# Patient Record
Sex: FEMALE | Race: WHITE | NOT HISPANIC OR LATINO | Employment: FULL TIME | URBAN - METROPOLITAN AREA
[De-identification: names, ages, dates, MRNs, and addresses within clinical notes are randomized per-mention and may not be internally consistent; named-entity substitution may affect disease eponyms.]

---

## 2017-09-19 ENCOUNTER — HOSPITAL ENCOUNTER (EMERGENCY)
Facility: HOSPITAL | Age: 37
Discharge: HOME | End: 2017-09-19
Attending: EMERGENCY MEDICINE | Admitting: EMERGENCY MEDICINE

## 2017-09-19 ENCOUNTER — APPOINTMENT (OUTPATIENT)
Dept: ULTRASOUND IMAGING | Facility: HOSPITAL | Age: 37
End: 2017-09-19

## 2017-09-19 VITALS
BODY MASS INDEX: 33.74 KG/M2 | TEMPERATURE: 98.2 F | RESPIRATION RATE: 18 BRPM | WEIGHT: 215 LBS | DIASTOLIC BLOOD PRESSURE: 69 MMHG | SYSTOLIC BLOOD PRESSURE: 113 MMHG | OXYGEN SATURATION: 94 % | HEIGHT: 67 IN | HEART RATE: 75 BPM

## 2017-09-19 DIAGNOSIS — O03.9 MISCARRIAGE: Primary | ICD-10-CM

## 2017-09-19 DIAGNOSIS — N93.9 VAGINAL BLEEDING: ICD-10-CM

## 2017-09-19 LAB
ABO GROUP BLD: NORMAL
ALBUMIN SERPL-MCNC: 4.4 G/DL (ref 3.2–4.8)
ALBUMIN/GLOB SERPL: 1.6 G/DL (ref 1.5–2.5)
ALP SERPL-CCNC: 53 U/L (ref 25–100)
ALT SERPL W P-5'-P-CCNC: 20 U/L (ref 7–40)
ANION GAP SERPL CALCULATED.3IONS-SCNC: 7 MMOL/L (ref 3–11)
AST SERPL-CCNC: 16 U/L (ref 0–33)
BASOPHILS # BLD AUTO: 0.05 10*3/MM3 (ref 0–0.2)
BASOPHILS NFR BLD AUTO: 0.7 % (ref 0–1)
BILIRUB SERPL-MCNC: 0.3 MG/DL (ref 0.3–1.2)
BUN BLD-MCNC: 6 MG/DL (ref 9–23)
BUN/CREAT SERPL: 10 (ref 7–25)
CALCIUM SPEC-SCNC: 9.5 MG/DL (ref 8.7–10.4)
CHLORIDE SERPL-SCNC: 108 MMOL/L (ref 99–109)
CO2 SERPL-SCNC: 22 MMOL/L (ref 20–31)
CREAT BLD-MCNC: 0.6 MG/DL (ref 0.6–1.3)
DEPRECATED RDW RBC AUTO: 43.6 FL (ref 37–54)
EOSINOPHIL # BLD AUTO: 0.04 10*3/MM3 (ref 0–0.3)
EOSINOPHIL NFR BLD AUTO: 0.5 % (ref 0–3)
ERYTHROCYTE [DISTWIDTH] IN BLOOD BY AUTOMATED COUNT: 12.4 % (ref 11.3–14.5)
GFR SERPL CREATININE-BSD FRML MDRD: 112 ML/MIN/1.73
GLOBULIN UR ELPH-MCNC: 2.8 GM/DL
GLUCOSE BLD-MCNC: 90 MG/DL (ref 70–100)
HCG INTACT+B SERPL-ACNC: 5313 MIU/ML
HCT VFR BLD AUTO: 37.4 % (ref 34.5–44)
HGB BLD-MCNC: 12.7 G/DL (ref 11.5–15.5)
HOLD SPECIMEN: NORMAL
HOLD SPECIMEN: NORMAL
IMM GRANULOCYTES # BLD: 0.02 10*3/MM3 (ref 0–0.03)
IMM GRANULOCYTES NFR BLD: 0.3 % (ref 0–0.6)
LYMPHOCYTES # BLD AUTO: 2.48 10*3/MM3 (ref 0.6–4.8)
LYMPHOCYTES NFR BLD AUTO: 32.5 % (ref 24–44)
MCH RBC QN AUTO: 32.7 PG (ref 27–31)
MCHC RBC AUTO-ENTMCNC: 34 G/DL (ref 32–36)
MCV RBC AUTO: 96.4 FL (ref 80–99)
MONOCYTES # BLD AUTO: 0.56 10*3/MM3 (ref 0–1)
MONOCYTES NFR BLD AUTO: 7.3 % (ref 0–12)
NEUTROPHILS # BLD AUTO: 4.49 10*3/MM3 (ref 1.5–8.3)
NEUTROPHILS NFR BLD AUTO: 58.7 % (ref 41–71)
NUMBER OF DOSES: NORMAL
PLATELET # BLD AUTO: 257 10*3/MM3 (ref 150–450)
PMV BLD AUTO: 11.8 FL (ref 6–12)
POTASSIUM BLD-SCNC: 3.4 MMOL/L (ref 3.5–5.5)
PROT SERPL-MCNC: 7.2 G/DL (ref 5.7–8.2)
RBC # BLD AUTO: 3.88 10*6/MM3 (ref 3.89–5.14)
RH BLD: POSITIVE
SODIUM BLD-SCNC: 137 MMOL/L (ref 132–146)
WBC NRBC COR # BLD: 7.64 10*3/MM3 (ref 3.5–10.8)
WHOLE BLOOD HOLD SPECIMEN: NORMAL
WHOLE BLOOD HOLD SPECIMEN: NORMAL

## 2017-09-19 PROCEDURE — 85025 COMPLETE CBC W/AUTO DIFF WBC: CPT | Performed by: EMERGENCY MEDICINE

## 2017-09-19 PROCEDURE — 76817 TRANSVAGINAL US OBSTETRIC: CPT

## 2017-09-19 PROCEDURE — 84702 CHORIONIC GONADOTROPIN TEST: CPT | Performed by: EMERGENCY MEDICINE

## 2017-09-19 PROCEDURE — 86900 BLOOD TYPING SEROLOGIC ABO: CPT | Performed by: EMERGENCY MEDICINE

## 2017-09-19 PROCEDURE — 99284 EMERGENCY DEPT VISIT MOD MDM: CPT

## 2017-09-19 PROCEDURE — 80053 COMPREHEN METABOLIC PANEL: CPT | Performed by: NURSE PRACTITIONER

## 2017-09-19 PROCEDURE — 86901 BLOOD TYPING SEROLOGIC RH(D): CPT | Performed by: EMERGENCY MEDICINE

## 2017-09-19 RX ORDER — IBUPROFEN 600 MG/1
600 TABLET ORAL EVERY 8 HOURS PRN
Qty: 15 TABLET | Refills: 0 | Status: SHIPPED | OUTPATIENT
Start: 2017-09-19 | End: 2021-11-15

## 2017-09-19 RX ORDER — SODIUM CHLORIDE 0.9 % (FLUSH) 0.9 %
10 SYRINGE (ML) INJECTION AS NEEDED
Status: DISCONTINUED | OUTPATIENT
Start: 2017-09-19 | End: 2017-09-19 | Stop reason: HOSPADM

## 2017-09-19 NOTE — ED PROVIDER NOTES
Subjective   Patient is a 37 y.o. female presenting with vaginal bleeding.   History provided by:  Patient  Vaginal Bleeding   Quality:  Bright red  Severity:  Moderate  Onset quality:  Sudden  Duration:  1 day  Timing:  Constant  Progression:  Waxing and waning  Chronicity:  New  Menstrual history:  Regular  Possible pregnancy: yes    Context comment:  Patient is a proximally 7 weeks pregnant.  Began bleeding this morning and has developed cramping.  Has history of miscarriage times one in the past  Relieved by:  Nothing  Worsened by:  Nothing  Ineffective treatments:  None tried  Associated symptoms: no dizziness, no dysuria, no fever and no nausea    Associated symptoms comment:  Pelvic cramping  Risk factors: prior miscarriage        Review of Systems   Constitutional: Negative for fever.   Gastrointestinal: Negative for diarrhea, nausea and vomiting.   Genitourinary: Positive for vaginal bleeding. Negative for dysuria, flank pain, frequency and hematuria.   Neurological: Negative for dizziness and light-headedness.   All other systems reviewed and are negative.      History reviewed. No pertinent past medical history.    No Known Allergies    Past Surgical History:   Procedure Laterality Date   • ANKLE SURGERY         History reviewed. No pertinent family history.    Social History     Social History   • Marital status: Single     Spouse name: N/A   • Number of children: N/A   • Years of education: N/A     Social History Main Topics   • Smoking status: Current Every Day Smoker     Packs/day: 1.00     Types: Cigarettes   • Smokeless tobacco: None   • Alcohol use No   • Drug use: No   • Sexual activity: Not Asked     Other Topics Concern   • None     Social History Narrative   • None           Objective   Physical Exam   Constitutional: She is oriented to person, place, and time. She appears well-developed and well-nourished.   HENT:   Right Ear: External ear normal.   Left Ear: External ear normal.    Mouth/Throat: Oropharynx is clear and moist.   Cardiovascular: Normal rate, regular rhythm, normal heart sounds and intact distal pulses.    Pulmonary/Chest: Effort normal and breath sounds normal.   Abdominal: Soft. Bowel sounds are normal. There is tenderness (Mild to moderate suprapubic tenderness). There is no rebound and no guarding.   Genitourinary: Uterus normal. Cervix exhibits no motion tenderness. Right adnexum displays no mass. Left adnexum displays no mass. There is bleeding in the vagina.   Neurological: She is alert and oriented to person, place, and time.   Skin: Skin is warm and dry.   Psychiatric: She has a normal mood and affect. Her behavior is normal.   Vitals reviewed.      Procedures         ED Course  ED Course      Recent Results (from the past 24 hour(s))   hCG, Quantitative, Pregnancy    Collection Time: 17  3:57 PM   Result Value Ref Range    HCG Quantitative 5313.00 mIU/mL    RhIg Evaluation    Collection Time: 17  3:57 PM   Result Value Ref Range    ABO Type A     RH type Positive    Doses of Rh Immune Globulin    Collection Time: 17  3:57 PM   Result Value Ref Range    Number of Doses       RhIg is not indicated due to the patient's Rh status   Light Blue Top    Collection Time: 17  3:57 PM   Result Value Ref Range    Extra Tube hold for add-on    Green Top (Gel)    Collection Time: 17  3:57 PM   Result Value Ref Range    Extra Tube Hold for add-ons.    Lavender Top    Collection Time: 17  3:57 PM   Result Value Ref Range    Extra Tube hold for add-on    Gold Top - SST    Collection Time: 17  3:57 PM   Result Value Ref Range    Extra Tube Hold for add-ons.    CBC Auto Differential    Collection Time: 17  3:57 PM   Result Value Ref Range    WBC 7.64 3.50 - 10.80 10*3/mm3    RBC 3.88 (L) 3.89 - 5.14 10*6/mm3    Hemoglobin 12.7 11.5 - 15.5 g/dL    Hematocrit 37.4 34.5 - 44.0 %    MCV 96.4 80.0 - 99.0 fL    MCH 32.7 (H) 27.0 - 31.0  pg    MCHC 34.0 32.0 - 36.0 g/dL    RDW 12.4 11.3 - 14.5 %    RDW-SD 43.6 37.0 - 54.0 fl    MPV 11.8 6.0 - 12.0 fL    Platelets 257 150 - 450 10*3/mm3    Neutrophil % 58.7 41.0 - 71.0 %    Lymphocyte % 32.5 24.0 - 44.0 %    Monocyte % 7.3 0.0 - 12.0 %    Eosinophil % 0.5 0.0 - 3.0 %    Basophil % 0.7 0.0 - 1.0 %    Immature Grans % 0.3 0.0 - 0.6 %    Neutrophils, Absolute 4.49 1.50 - 8.30 10*3/mm3    Lymphocytes, Absolute 2.48 0.60 - 4.80 10*3/mm3    Monocytes, Absolute 0.56 0.00 - 1.00 10*3/mm3    Eosinophils, Absolute 0.04 0.00 - 0.30 10*3/mm3    Basophils, Absolute 0.05 0.00 - 0.20 10*3/mm3    Immature Grans, Absolute 0.02 0.00 - 0.03 10*3/mm3   Comprehensive Metabolic Panel    Collection Time: 09/19/17  3:57 PM   Result Value Ref Range    Glucose 90 70 - 100 mg/dL    BUN 6 (L) 9 - 23 mg/dL    Creatinine 0.60 0.60 - 1.30 mg/dL    Sodium 137 132 - 146 mmol/L    Potassium 3.4 (L) 3.5 - 5.5 mmol/L    Chloride 108 99 - 109 mmol/L    CO2 22.0 20.0 - 31.0 mmol/L    Calcium 9.5 8.7 - 10.4 mg/dL    Total Protein 7.2 5.7 - 8.2 g/dL    Albumin 4.40 3.20 - 4.80 g/dL    ALT (SGPT) 20 7 - 40 U/L    AST (SGOT) 16 0 - 33 U/L    Alkaline Phosphatase 53 25 - 100 U/L    Total Bilirubin 0.3 0.3 - 1.2 mg/dL    eGFR Non African Amer 112 >60 mL/min/1.73    Globulin 2.8 gm/dL    A/G Ratio 1.6 1.5 - 2.5 g/dL    BUN/Creatinine Ratio 10.0 7.0 - 25.0    Anion Gap 7.0 3.0 - 11.0 mmol/L     Note: In addition to lab results from this visit, the labs listed above may include labs taken at another facility or during a different encounter within the last 24 hours. Please correlate lab times with ED admission and discharge times for further clarification of the services performed during this visit.    US Ob Transvaginal    (Results Pending)     Vitals:    09/19/17 1550 09/19/17 1715 09/19/17 1716 09/19/17 1717   BP: 138/78 128/76 126/76 113/69   BP Location: Left arm      Patient Position: Sitting Lying Sitting Standing   Pulse: 80 65 78 75  "  Resp: 18      Temp: 98.2 °F (36.8 °C)      TempSrc: Oral      SpO2: 94%      Weight: 215 lb (97.5 kg)      Height: 67\" (170.2 cm)        Medications   sodium chloride 0.9 % flush 10 mL (not administered)     ECG/EMG Results (last 24 hours)     ** No results found for the last 24 hours. **          US Transvaginal OB: No identifiable fetal pole or yolk sac.  No identifiable products of conception.  Per radiology voice dictation      Discussed ultrasound findings and blood work with patient.  Discussed the need for close follow-up with OB in the next day or 2.  Instructed to return to the ER with development of fever, uncontrolled pain or significant increase in bleeding.  The patient verbalized understanding        MDM    Final diagnoses:   Miscarriage   Vaginal bleeding            Ameena Mansfield, APRN  09/19/17 1812    "

## 2017-09-19 NOTE — DISCHARGE INSTRUCTIONS
No strenuous activity or heavy lifting for the next 3-4 days.  Follow-up with Dr. Colón with OB/GYN in the next 1-2 days.  Return to the ER with a significant increase in bleeding, development of dizziness, lightheadedness, increased in pain or new symptoms.  May take ibuprofen as instructed for cramping.

## 2017-09-21 ENCOUNTER — OFFICE VISIT (OUTPATIENT)
Dept: OBSTETRICS AND GYNECOLOGY | Facility: CLINIC | Age: 37
End: 2017-09-21

## 2017-09-21 VITALS
HEART RATE: 76 BPM | WEIGHT: 213 LBS | SYSTOLIC BLOOD PRESSURE: 120 MMHG | BODY MASS INDEX: 33.36 KG/M2 | DIASTOLIC BLOOD PRESSURE: 76 MMHG

## 2017-09-21 DIAGNOSIS — O02.1 MISSED ABORTION: Primary | ICD-10-CM

## 2017-09-21 PROCEDURE — 99203 OFFICE O/P NEW LOW 30 MIN: CPT | Performed by: OBSTETRICS & GYNECOLOGY

## 2017-09-21 RX ORDER — MISOPROSTOL 200 UG/1
TABLET ORAL
Qty: 8 TABLET | Refills: 0 | Status: SHIPPED | OUTPATIENT
Start: 2017-09-21 | End: 2019-05-06

## 2017-09-21 NOTE — PROGRESS NOTES
Subjective   Vania Garcia is a 37 y.o. female.     History of Present Illness  Patient has a anovulatory gestational sac on ultrasound done in the emergency room.  This is her second ultrasound showing the same problem.  The patient is now bleeding.  She does not want a D&C but would like to use Cytotec to produce the spontaneous .  Her blood type is A+  The following portions of the patient's history were reviewed and updated as appropriate: allergies, current medications, past family history, past medical history, past social history, past surgical history and problem list.    Review of Systems   Constitutional: Negative.    HENT: Negative.    Respiratory: Negative.    Cardiovascular: Negative.    Gastrointestinal: Negative.    Endocrine: Negative.    Genitourinary: Negative.    Musculoskeletal: Negative.    Skin: Negative.    Neurological: Negative.    Psychiatric/Behavioral: Negative.        Objective   Physical Exam   Constitutional: She is oriented to person, place, and time. She appears well-developed and well-nourished.   Neck: Normal range of motion. Neck supple.   Cardiovascular: Normal rate, regular rhythm, normal heart sounds and intact distal pulses.  Exam reveals no gallop and no friction rub.    No murmur heard.  Pulmonary/Chest: Effort normal and breath sounds normal. No respiratory distress. She has no wheezes. She has no rales. She exhibits no tenderness.   Abdominal: Soft. Bowel sounds are normal. She exhibits no distension and no mass. There is no tenderness. There is no rebound and no guarding. No hernia.   Genitourinary: Pelvic exam was performed with patient supine. No labial fusion. There is no rash, tenderness, lesion or injury on the right labia. There is no rash, tenderness, lesion or injury on the left labia. Uterus is enlarged. Uterus is not deviated, not fixed and not tender. Cervix exhibits discharge. Cervix exhibits no motion tenderness and no friability. Right adnexum  displays no mass, no tenderness and no fullness. Left adnexum displays no mass, no tenderness and no fullness. There is bleeding in the vagina. No erythema or tenderness in the vagina. No foreign body in the vagina. No signs of injury around the vagina. No vaginal discharge found.       Musculoskeletal: Normal range of motion. She exhibits no edema or deformity.   Neurological: She is alert and oriented to person, place, and time. She has normal reflexes.   Skin: Skin is warm and dry.   Psychiatric: She has a normal mood and affect. Her behavior is normal. Judgment and thought content normal.       Assessment/Plan   Vania was seen today for gynecologic exam.    Diagnoses and all orders for this visit:    Missed   -     misoprostol (CYTOTEC) 200 MCG tablet; Insert 4 tablets into the vagina and repeat in 12 hours         Return 1 week    Emil Jacques MD

## 2017-09-28 ENCOUNTER — OFFICE VISIT (OUTPATIENT)
Dept: OBSTETRICS AND GYNECOLOGY | Facility: CLINIC | Age: 37
End: 2017-09-28

## 2017-09-28 VITALS
WEIGHT: 215 LBS | SYSTOLIC BLOOD PRESSURE: 108 MMHG | DIASTOLIC BLOOD PRESSURE: 70 MMHG | BODY MASS INDEX: 33.67 KG/M2 | HEART RATE: 80 BPM

## 2017-09-28 DIAGNOSIS — Z33.2: Primary | ICD-10-CM

## 2017-09-28 DIAGNOSIS — Z30.42 ENCOUNTER FOR DEPO-PROVERA CONTRACEPTION: ICD-10-CM

## 2017-09-28 DIAGNOSIS — O02.1 MISSED ABORTION: ICD-10-CM

## 2017-09-28 PROCEDURE — 96372 THER/PROPH/DIAG INJ SC/IM: CPT | Performed by: OBSTETRICS & GYNECOLOGY

## 2017-09-28 PROCEDURE — 99212 OFFICE O/P EST SF 10 MIN: CPT | Performed by: OBSTETRICS & GYNECOLOGY

## 2017-09-28 RX ORDER — MEDROXYPROGESTERONE ACETATE 150 MG/ML
150 INJECTION, SUSPENSION INTRAMUSCULAR
Qty: 1 ML | Refills: 3 | Status: SHIPPED | OUTPATIENT
Start: 2017-09-28 | End: 2018-09-04

## 2017-09-28 RX ORDER — MEDROXYPROGESTERONE ACETATE 150 MG/ML
150 INJECTION, SUSPENSION INTRAMUSCULAR ONCE
Status: COMPLETED | OUTPATIENT
Start: 2017-09-28 | End: 2017-09-28

## 2017-09-28 RX ADMIN — MEDROXYPROGESTERONE ACETATE 150 MG: 150 INJECTION, SUSPENSION INTRAMUSCULAR at 15:46

## 2017-09-28 NOTE — PROGRESS NOTES
Subjective   Vania Garcia is a 37 y.o. female.     History of Present Illness  Patient was diagnosed with missed  by last ultrasound last Thursday.  She underwent Cytotec induction and proceeded to spontaneously abort on 2017.  The patient has minimal bleeding now.  She wants to use Depo-Provera for birth control.  A prescription was sent to her pharmacy and the patient will obtain the medication.  She will return to the office today for initiation of Depo-Provera 150 mg IM and repeat in 3 months.  The patient is A+.    The following portions of the patient's history were reviewed and updated as appropriate: allergies, current medications, past family history, past medical history, past social history, past surgical history and problem list.    Review of Systems   Constitutional: Negative.    Respiratory: Negative.    Cardiovascular: Negative.    Gastrointestinal: Negative.    Genitourinary:        Small amount of vaginal bleeding persist, no problems   Psychiatric/Behavioral: Negative.        Objective   Physical Exam   Constitutional: She appears well-developed and well-nourished.   Abdominal: Soft. Bowel sounds are normal.   Psychiatric: She has a normal mood and affect. Her behavior is normal. Judgment and thought content normal.   Nursing note and vitals reviewed.      Assessment/Plan   Vania was seen today for follow-up.    Diagnoses and all orders for this visit:    Legally induced     Missed     Encounter for Depo-Provera contraception  -     medroxyPROGESTERone (DEPO-PROVERA) 150 MG/ML injection; Inject 1 mL into the shoulder, thigh, or buttocks Every 3 (Three) Months.       Return 3 months    Emil Jacques MD

## 2017-12-20 ENCOUNTER — CLINICAL SUPPORT (OUTPATIENT)
Dept: OBSTETRICS AND GYNECOLOGY | Facility: CLINIC | Age: 37
End: 2017-12-20

## 2017-12-20 DIAGNOSIS — Z30.42 ENCOUNTER FOR SURVEILLANCE OF INJECTABLE CONTRACEPTIVE: Primary | ICD-10-CM

## 2017-12-20 RX ORDER — MEDROXYPROGESTERONE ACETATE 150 MG/ML
150 INJECTION, SUSPENSION INTRAMUSCULAR ONCE
Status: DISCONTINUED | OUTPATIENT
Start: 2017-12-20 | End: 2017-12-20

## 2017-12-20 RX ORDER — MEDROXYPROGESTERONE ACETATE 150 MG/ML
150 INJECTION, SUSPENSION INTRAMUSCULAR ONCE
Status: COMPLETED | OUTPATIENT
Start: 2017-12-20 | End: 2017-12-20

## 2017-12-20 RX ADMIN — MEDROXYPROGESTERONE ACETATE 150 MG: 150 INJECTION, SUSPENSION INTRAMUSCULAR at 16:24

## 2018-03-20 ENCOUNTER — CLINICAL SUPPORT (OUTPATIENT)
Dept: OBSTETRICS AND GYNECOLOGY | Facility: CLINIC | Age: 38
End: 2018-03-20

## 2018-03-20 VITALS — WEIGHT: 229 LBS | BODY MASS INDEX: 35.94 KG/M2 | HEIGHT: 67 IN

## 2018-03-20 DIAGNOSIS — Z30.42 ENCOUNTER FOR SURVEILLANCE OF INJECTABLE CONTRACEPTIVE: Primary | ICD-10-CM

## 2018-03-20 PROCEDURE — 96372 THER/PROPH/DIAG INJ SC/IM: CPT | Performed by: OBSTETRICS & GYNECOLOGY

## 2018-03-20 RX ORDER — MEDROXYPROGESTERONE ACETATE 150 MG/ML
150 INJECTION, SUSPENSION INTRAMUSCULAR ONCE
Status: COMPLETED | OUTPATIENT
Start: 2018-03-20 | End: 2018-03-20

## 2018-03-20 RX ADMIN — MEDROXYPROGESTERONE ACETATE 150 MG: 150 INJECTION, SUSPENSION INTRAMUSCULAR at 15:38

## 2018-06-12 ENCOUNTER — CLINICAL SUPPORT (OUTPATIENT)
Dept: OBSTETRICS AND GYNECOLOGY | Facility: CLINIC | Age: 38
End: 2018-06-12

## 2018-06-12 VITALS — WEIGHT: 227.4 LBS | BODY MASS INDEX: 35.69 KG/M2 | HEIGHT: 67 IN

## 2018-06-12 DIAGNOSIS — Z30.42 ENCOUNTER FOR SURVEILLANCE OF INJECTABLE CONTRACEPTIVE: Primary | ICD-10-CM

## 2018-06-12 PROCEDURE — 96372 THER/PROPH/DIAG INJ SC/IM: CPT | Performed by: OBSTETRICS & GYNECOLOGY

## 2018-06-12 RX ORDER — MEDROXYPROGESTERONE ACETATE 150 MG/ML
150 INJECTION, SUSPENSION INTRAMUSCULAR ONCE
Status: COMPLETED | OUTPATIENT
Start: 2018-06-12 | End: 2018-06-12

## 2018-06-12 RX ADMIN — MEDROXYPROGESTERONE ACETATE 150 MG: 150 INJECTION, SUSPENSION INTRAMUSCULAR at 15:25

## 2018-09-04 ENCOUNTER — CLINICAL SUPPORT (OUTPATIENT)
Dept: OBSTETRICS AND GYNECOLOGY | Facility: CLINIC | Age: 38
End: 2018-09-04

## 2018-09-04 VITALS — BODY MASS INDEX: 35.12 KG/M2 | HEIGHT: 67 IN | WEIGHT: 223.8 LBS

## 2018-09-04 DIAGNOSIS — Z30.42 ENCOUNTER FOR DEPO-PROVERA CONTRACEPTION: ICD-10-CM

## 2018-09-04 DIAGNOSIS — Z30.42 ENCOUNTER FOR SURVEILLANCE OF INJECTABLE CONTRACEPTIVE: Primary | ICD-10-CM

## 2018-09-04 PROCEDURE — 96372 THER/PROPH/DIAG INJ SC/IM: CPT | Performed by: OBSTETRICS & GYNECOLOGY

## 2018-09-04 RX ORDER — MEDROXYPROGESTERONE ACETATE 150 MG/ML
150 INJECTION, SUSPENSION INTRAMUSCULAR
Qty: 1 ML | Refills: 3 | Status: ON HOLD | OUTPATIENT
Start: 2018-09-04 | End: 2019-10-31

## 2018-09-04 RX ORDER — MEDROXYPROGESTERONE ACETATE 150 MG/ML
150 INJECTION, SUSPENSION INTRAMUSCULAR ONCE
Status: COMPLETED | OUTPATIENT
Start: 2018-09-04 | End: 2018-09-04

## 2018-09-04 RX ADMIN — MEDROXYPROGESTERONE ACETATE 150 MG: 150 INJECTION, SUSPENSION INTRAMUSCULAR at 15:53

## 2019-05-06 ENCOUNTER — HOSPITAL ENCOUNTER (EMERGENCY)
Facility: HOSPITAL | Age: 39
Discharge: HOME | End: 2019-05-06
Attending: EMERGENCY MEDICINE | Admitting: EMERGENCY MEDICINE

## 2019-05-06 VITALS
RESPIRATION RATE: 16 BRPM | BODY MASS INDEX: 36.1 KG/M2 | HEIGHT: 67 IN | OXYGEN SATURATION: 98 % | TEMPERATURE: 98.1 F | WEIGHT: 230 LBS | SYSTOLIC BLOOD PRESSURE: 156 MMHG | HEART RATE: 79 BPM | DIASTOLIC BLOOD PRESSURE: 83 MMHG

## 2019-05-06 DIAGNOSIS — S61.411A LACERATION OF RIGHT HAND WITHOUT FOREIGN BODY, INITIAL ENCOUNTER: Primary | ICD-10-CM

## 2019-05-06 PROCEDURE — 25010000002 TDAP 5-2.5-18.5 LF-MCG/0.5 SUSPENSION: Performed by: NURSE PRACTITIONER

## 2019-05-06 PROCEDURE — 99283 EMERGENCY DEPT VISIT LOW MDM: CPT

## 2019-05-06 PROCEDURE — 90715 TDAP VACCINE 7 YRS/> IM: CPT | Performed by: NURSE PRACTITIONER

## 2019-05-06 PROCEDURE — 90471 IMMUNIZATION ADMIN: CPT | Performed by: NURSE PRACTITIONER

## 2019-05-06 RX ORDER — BACITRACIN, NEOMYCIN, POLYMYXIN B 400; 3.5; 5 [USP'U]/G; MG/G; [USP'U]/G
1 OINTMENT TOPICAL ONCE
Status: COMPLETED | OUTPATIENT
Start: 2019-05-06 | End: 2019-05-06

## 2019-05-06 RX ORDER — CEPHALEXIN 500 MG/1
500 CAPSULE ORAL 2 TIMES DAILY
Qty: 10 CAPSULE | Refills: 0 | Status: ON HOLD | OUTPATIENT
Start: 2019-05-06 | End: 2019-10-31

## 2019-05-06 RX ADMIN — TETANUS TOXOID, REDUCED DIPHTHERIA TOXOID AND ACELLULAR PERTUSSIS VACCINE, ADSORBED 0.5 ML: 5; 2.5; 8; 8; 2.5 SUSPENSION INTRAMUSCULAR at 16:07

## 2019-05-06 RX ADMIN — BACITRACIN, NEOMYCIN, POLYMYXIN B 1 APPLICATION: 400; 3.5; 5 OINTMENT TOPICAL at 16:07

## 2019-05-06 NOTE — ED PROVIDER NOTES
Subjective   Ms. Vania Garcia is a 39 year old female who presents to the ED with c/o a hand laceration.  Patient reports she was at work cutting cheese with a knife just prior to arrival when it slipped from her control and struck her right palm.  Sustained a laceration to the right palm.  Presents for sutures.  Denies any significant pain at this time.  Bleeding is well controlled.  Tetanus status is out-of-date.        History provided by:  Patient  Laceration   Location:  Hand  Hand laceration location:  R palm  Quality: straight    Bleeding: controlled    Time since incident: just PTA.  Laceration mechanism:  Knife  Pain details:     Severity:  No pain  Foreign body present:  No foreign bodies  Tetanus status:  Out of date      Review of Systems   Skin: Positive for wound (laceration to right hand).       History reviewed. No pertinent past medical history.    No Known Allergies    Past Surgical History:   Procedure Laterality Date   • ANKLE SURGERY         Family History   Problem Relation Age of Onset   • Breast cancer Neg Hx    • Ovarian cancer Neg Hx    • Colon cancer Neg Hx    • Diabetes Neg Hx        Social History     Socioeconomic History   • Marital status: Single     Spouse name: Not on file   • Number of children: Not on file   • Years of education: Not on file   • Highest education level: Not on file   Tobacco Use   • Smoking status: Current Every Day Smoker     Packs/day: 1.00     Types: Cigarettes   • Smokeless tobacco: Never Used   Substance and Sexual Activity   • Alcohol use: Yes   • Drug use: Yes     Types: Marijuana         Objective   Physical Exam   Constitutional: She is oriented to person, place, and time. She appears well-developed and well-nourished. No distress.   HENT:   Head: Normocephalic and atraumatic.   Eyes: Conjunctivae are normal. No scleral icterus.   Neck: Normal range of motion and phonation normal. Neck supple.   Cardiovascular: Normal rate, regular rhythm, normal heart  sounds and intact distal pulses. Exam reveals no gallop and no friction rub.   No murmur heard.  Pulmonary/Chest: Effort normal and breath sounds normal. No respiratory distress. She has no wheezes. She has no rales.   Abdominal: Soft. Bowel sounds are normal. There is no tenderness. There is no guarding.   Musculoskeletal: Normal range of motion.   Neurological: She is alert and oriented to person, place, and time.   Skin: Skin is warm and dry. Laceration noted. She is not diaphoretic.        2.5 centimeter clean straight laceration to the right palm.  Bleeding controlled.  No foreign bodies.   Psychiatric: She has a normal mood and affect. Her behavior is normal.   Nursing note and vitals reviewed.      Laceration Repair  Date/Time: 5/6/2019 3:49 PM  Performed by: Ameena Mansfield APRN  Authorized by: Donny Westbrook MD     Consent:     Consent obtained:  Verbal    Consent given by:  Patient  Anesthesia (see MAR for exact dosages):     Anesthesia method:  Local infiltration    Local anesthetic:  Lidocaine 1% w/o epi (5 cc)  Laceration details:     Location:  Hand    Hand location:  R palm    Length (cm):  2.5  Repair type:     Repair type:  Simple  Treatment:     Area cleansed with:  Betadine    Amount of cleaning:  Standard    Visualized foreign bodies/material removed: no    Skin repair:     Repair method:  Sutures    Suture size:  4-0    Suture material:  Nylon    Suture technique:  Simple interrupted    Number of sutures:  6  Approximation:     Approximation:  Close    Vermilion border: well-aligned    Post-procedure details:     Patient tolerance of procedure:  Tolerated well, no immediate complications                   ED Course       No results found for this or any previous visit (from the past 24 hour(s)).  Note: In addition to lab results from this visit, the labs listed above may include labs taken at another facility or during a different encounter within the last 24 hours. Please correlate lab times  "with ED admission and discharge times for further clarification of the services performed during this visit.    No orders to display     Vitals:    05/06/19 1256   BP: 156/83   BP Location: Right arm   Patient Position: Sitting   Pulse: 79   Resp: 16   Temp: 98.1 °F (36.7 °C)   TempSrc: Oral   SpO2: 98%   Weight: 104 kg (230 lb)   Height: 170.2 cm (67\")     Medications   neomycin-bacitracin-polymyxin (NEOSPORIN) ointment 1 application (1 application Topical Given 5/6/19 1607)   Tdap (BOOSTRIX) injection 0.5 mL (0.5 mL Intramuscular Given 5/6/19 1607)     ECG/EMG Results (last 24 hours)     ** No results found for the last 24 hours. **        No orders to display                     MDM    Final diagnoses:   Laceration of right hand without foreign body, initial encounter       Documentation assistance provided by immanuel Felix.  Information recorded by the scribe was done at my direction and has been verified and validated by me.     Guido Felix  05/06/19 1610       Guido Felix  05/06/19 1610       Ameena Mansfield APRN  05/06/19 2300    "

## 2019-05-13 ENCOUNTER — HOSPITAL ENCOUNTER (EMERGENCY)
Facility: HOSPITAL | Age: 39
Discharge: HOME | End: 2019-05-13

## 2019-05-13 VITALS
HEART RATE: 83 BPM | RESPIRATION RATE: 16 BRPM | OXYGEN SATURATION: 97 % | BODY MASS INDEX: 36.1 KG/M2 | TEMPERATURE: 98.3 F | SYSTOLIC BLOOD PRESSURE: 124 MMHG | DIASTOLIC BLOOD PRESSURE: 80 MMHG | HEIGHT: 67 IN | WEIGHT: 230 LBS

## 2019-05-13 PROCEDURE — 99201: CPT

## 2019-08-17 ENCOUNTER — HOSPITAL ENCOUNTER (EMERGENCY)
Facility: HOSPITAL | Age: 39
Discharge: HOME | End: 2019-08-17
Attending: EMERGENCY MEDICINE | Admitting: EMERGENCY MEDICINE

## 2019-08-17 ENCOUNTER — APPOINTMENT (OUTPATIENT)
Dept: GENERAL RADIOLOGY | Facility: HOSPITAL | Age: 39
End: 2019-08-17

## 2019-08-17 VITALS
HEART RATE: 78 BPM | WEIGHT: 233 LBS | RESPIRATION RATE: 18 BRPM | TEMPERATURE: 97.7 F | HEIGHT: 67 IN | SYSTOLIC BLOOD PRESSURE: 113 MMHG | OXYGEN SATURATION: 96 % | DIASTOLIC BLOOD PRESSURE: 79 MMHG | BODY MASS INDEX: 36.57 KG/M2

## 2019-08-17 DIAGNOSIS — S83.411A SPRAIN OF MEDIAL COLLATERAL LIGAMENT OF RIGHT KNEE, INITIAL ENCOUNTER: Primary | ICD-10-CM

## 2019-08-17 PROCEDURE — 99283 EMERGENCY DEPT VISIT LOW MDM: CPT

## 2019-08-17 PROCEDURE — 73560 X-RAY EXAM OF KNEE 1 OR 2: CPT

## 2019-08-17 RX ORDER — VARENICLINE TARTRATE 1 MG/1
1 TABLET, FILM COATED ORAL 2 TIMES DAILY
COMMUNITY
End: 2021-11-15

## 2019-08-17 NOTE — ED PROVIDER NOTES
Subjective   Right medial knee pain x1 day.  Started last night when she was wrestling with her brother.  She felt a pop.  She has been able to walk on it but just painful range of motion and feels stiff.  No fever no numbness or tingling.        Leg Injury   Location:  Right knee  Severity:  Moderate  Duration:  1 day  Progression:  Unchanged  Chronicity:  New  Relieved by:  Rest  Worsened by:  Movement  Associated symptoms: no abdominal pain, no chest pain, no congestion, no cough, no diarrhea, no fever, no nausea, no shortness of breath, no sore throat, no vomiting and no wheezing        Review of Systems   Constitutional: Negative for chills, diaphoresis and fever.   HENT: Negative for congestion and sore throat.    Respiratory: Negative for cough, choking, chest tightness, shortness of breath and wheezing.    Cardiovascular: Negative for chest pain and leg swelling.   Gastrointestinal: Negative for abdominal distention, abdominal pain, anal bleeding, blood in stool, constipation, diarrhea, nausea and vomiting.   Genitourinary: Negative for difficulty urinating, dysuria, flank pain, frequency, hematuria and urgency.   All other systems reviewed and are negative.      History reviewed. No pertinent past medical history.    No Known Allergies    Past Surgical History:   Procedure Laterality Date   • ANKLE SURGERY         Family History   Problem Relation Age of Onset   • Breast cancer Neg Hx    • Ovarian cancer Neg Hx    • Colon cancer Neg Hx    • Diabetes Neg Hx        Social History     Socioeconomic History   • Marital status: Single     Spouse name: Not on file   • Number of children: Not on file   • Years of education: Not on file   • Highest education level: Not on file   Tobacco Use   • Smoking status: Current Every Day Smoker     Packs/day: 1.00     Types: Cigarettes   • Smokeless tobacco: Never Used   Substance and Sexual Activity   • Alcohol use: Yes   • Drug use: Yes     Types: Marijuana   • Sexual  activity: Defer           Objective   Physical Exam   Constitutional: She is oriented to person, place, and time. She appears well-developed and well-nourished.   HENT:   Head: Normocephalic and atraumatic.   Right Ear: External ear normal.   Left Ear: External ear normal.   Nose: Nose normal.   Mouth/Throat: Oropharynx is clear and moist.   Eyes: Conjunctivae and EOM are normal. Pupils are equal, round, and reactive to light.   Neck: Normal range of motion. Neck supple.   Cardiovascular: Normal rate, regular rhythm, normal heart sounds and intact distal pulses.   Pulmonary/Chest: Effort normal and breath sounds normal.   Abdominal: Soft. Bowel sounds are normal.   Musculoskeletal:        Right knee: She exhibits decreased range of motion (rt pain) and swelling. She exhibits normal patellar mobility. Tenderness found. Medial joint line tenderness noted.   Neurological: She is alert and oriented to person, place, and time.   Skin: Skin is warm and dry.   Psychiatric: She has a normal mood and affect. Her behavior is normal. Judgment and thought content normal.       Procedures           ED Course        Pt thankful and agreeable with tx poc. Well aware of the ss of worsening condition.          XR Knee 1 or 2 View Right   Preliminary Result   No acute bony abnormality.       DICTATED:   08/17/2019   EDITED/ls :   08/17/2019                     [facility]      Final diagnoses:   Sprain of medial collateral ligament of right knee, initial encounter            Nacho Mansfield APRN  08/17/19 0993

## 2019-10-30 ENCOUNTER — ANESTHESIA EVENT (OUTPATIENT)
Dept: PERIOP | Facility: HOSPITAL | Age: 39
End: 2019-10-30

## 2019-10-30 RX ORDER — NAPROXEN 500 MG/1
500 TABLET ORAL AS NEEDED
COMMUNITY
End: 2021-11-10

## 2019-10-30 RX ORDER — DOXYCYCLINE HYCLATE 50 MG/1
50 CAPSULE ORAL DAILY
COMMUNITY
End: 2021-11-10

## 2019-10-31 ENCOUNTER — ANESTHESIA (OUTPATIENT)
Dept: PERIOP | Facility: HOSPITAL | Age: 39
End: 2019-10-31

## 2019-10-31 ENCOUNTER — HOSPITAL ENCOUNTER (OUTPATIENT)
Facility: HOSPITAL | Age: 39
Setting detail: HOSPITAL OUTPATIENT SURGERY
Discharge: HOME | End: 2019-10-31
Attending: ORTHOPAEDIC SURGERY | Admitting: ORTHOPAEDIC SURGERY

## 2019-10-31 VITALS
BODY MASS INDEX: 35.79 KG/M2 | WEIGHT: 228 LBS | SYSTOLIC BLOOD PRESSURE: 105 MMHG | DIASTOLIC BLOOD PRESSURE: 52 MMHG | HEIGHT: 67 IN | RESPIRATION RATE: 16 BRPM | HEART RATE: 77 BPM | TEMPERATURE: 97.2 F | OXYGEN SATURATION: 96 %

## 2019-10-31 PROBLEM — S83.511A NEW ACL TEAR, RIGHT, INITIAL ENCOUNTER: Status: ACTIVE | Noted: 2019-10-31

## 2019-10-31 LAB
B-HCG UR QL: NEGATIVE
INTERNAL NEGATIVE CONTROL: NEGATIVE
INTERNAL POSITIVE CONTROL: POSITIVE
Lab: NORMAL

## 2019-10-31 PROCEDURE — 25010000002 FENTANYL CITRATE (PF) 100 MCG/2ML SOLUTION: Performed by: NURSE ANESTHETIST, CERTIFIED REGISTERED

## 2019-10-31 PROCEDURE — C1713 ANCHOR/SCREW BN/BN,TIS/BN: HCPCS | Performed by: ORTHOPAEDIC SURGERY

## 2019-10-31 PROCEDURE — 25010000002 PROPOFOL 10 MG/ML EMULSION: Performed by: NURSE ANESTHETIST, CERTIFIED REGISTERED

## 2019-10-31 PROCEDURE — 25010000002 MIDAZOLAM PER 1 MG: Performed by: ANESTHESIOLOGY

## 2019-10-31 PROCEDURE — 97162 PT EVAL MOD COMPLEX 30 MIN: CPT

## 2019-10-31 PROCEDURE — L1833 KO ADJ JNT POS R SUP PRE OTS: HCPCS | Performed by: ORTHOPAEDIC SURGERY

## 2019-10-31 PROCEDURE — 25010000002 ROPIVACAINE PER 1 MG: Performed by: NURSE ANESTHETIST, CERTIFIED REGISTERED

## 2019-10-31 PROCEDURE — 81025 URINE PREGNANCY TEST: CPT | Performed by: ANESTHESIOLOGY

## 2019-10-31 PROCEDURE — 25010000002 ONDANSETRON PER 1 MG: Performed by: NURSE ANESTHETIST, CERTIFIED REGISTERED

## 2019-10-31 PROCEDURE — 25010000003 CEFAZOLIN IN DEXTROSE 2-4 GM/100ML-% SOLUTION: Performed by: ORTHOPAEDIC SURGERY

## 2019-10-31 PROCEDURE — 25010000002 DEXAMETHASONE SODIUM PHOSPHATE 10 MG/ML SOLUTION: Performed by: ANESTHESIOLOGY

## 2019-10-31 PROCEDURE — 25010000002 DEXAMETHASONE PER 1 MG: Performed by: NURSE ANESTHETIST, CERTIFIED REGISTERED

## 2019-10-31 PROCEDURE — 25010000002 BUPRENORPHINE PER 0.1 MG: Performed by: ANESTHESIOLOGY

## 2019-10-31 PROCEDURE — 25010000002 FENTANYL CITRATE (PF) 100 MCG/2ML SOLUTION: Performed by: ANESTHESIOLOGY

## 2019-10-31 DEVICE — IMPLANTABLE DEVICE: Type: IMPLANTABLE DEVICE | Site: KNEE | Status: FUNCTIONAL

## 2019-10-31 RX ORDER — PROMETHAZINE HYDROCHLORIDE 25 MG/ML
6.25 INJECTION, SOLUTION INTRAMUSCULAR; INTRAVENOUS ONCE AS NEEDED
Status: DISCONTINUED | OUTPATIENT
Start: 2019-10-31 | End: 2019-10-31 | Stop reason: HOSPADM

## 2019-10-31 RX ORDER — LIDOCAINE HYDROCHLORIDE 10 MG/ML
0.5 INJECTION, SOLUTION EPIDURAL; INFILTRATION; INTRACAUDAL; PERINEURAL ONCE AS NEEDED
Status: COMPLETED | OUTPATIENT
Start: 2019-10-31 | End: 2019-10-31

## 2019-10-31 RX ORDER — ONDANSETRON 2 MG/ML
INJECTION INTRAMUSCULAR; INTRAVENOUS AS NEEDED
Status: DISCONTINUED | OUTPATIENT
Start: 2019-10-31 | End: 2019-10-31 | Stop reason: SURG

## 2019-10-31 RX ORDER — MIDAZOLAM HYDROCHLORIDE 1 MG/ML
INJECTION INTRAMUSCULAR; INTRAVENOUS
Status: COMPLETED | OUTPATIENT
Start: 2019-10-31 | End: 2019-10-31

## 2019-10-31 RX ORDER — SODIUM CHLORIDE, SODIUM LACTATE, POTASSIUM CHLORIDE, AND CALCIUM CHLORIDE .6; .31; .03; .02 G/100ML; G/100ML; G/100ML; G/100ML
IRRIGANT IRRIGATION AS NEEDED
Status: DISCONTINUED | OUTPATIENT
Start: 2019-10-31 | End: 2019-10-31 | Stop reason: HOSPADM

## 2019-10-31 RX ORDER — HYDROMORPHONE HYDROCHLORIDE 1 MG/ML
0.5 INJECTION, SOLUTION INTRAMUSCULAR; INTRAVENOUS; SUBCUTANEOUS
Status: DISCONTINUED | OUTPATIENT
Start: 2019-10-31 | End: 2019-10-31 | Stop reason: HOSPADM

## 2019-10-31 RX ORDER — SODIUM CHLORIDE 0.9 % (FLUSH) 0.9 %
3-10 SYRINGE (ML) INJECTION AS NEEDED
Status: DISCONTINUED | OUTPATIENT
Start: 2019-10-31 | End: 2019-10-31 | Stop reason: HOSPADM

## 2019-10-31 RX ORDER — SODIUM CHLORIDE 0.9 % (FLUSH) 0.9 %
3 SYRINGE (ML) INJECTION EVERY 12 HOURS SCHEDULED
Status: DISCONTINUED | OUTPATIENT
Start: 2019-10-31 | End: 2019-10-31 | Stop reason: HOSPADM

## 2019-10-31 RX ORDER — BUPIVACAINE HYDROCHLORIDE 2.5 MG/ML
INJECTION, SOLUTION EPIDURAL; INFILTRATION; INTRACAUDAL
Status: COMPLETED | OUTPATIENT
Start: 2019-10-31 | End: 2019-10-31

## 2019-10-31 RX ORDER — PROPOFOL 10 MG/ML
VIAL (ML) INTRAVENOUS AS NEEDED
Status: DISCONTINUED | OUTPATIENT
Start: 2019-10-31 | End: 2019-10-31 | Stop reason: SURG

## 2019-10-31 RX ORDER — PROMETHAZINE HYDROCHLORIDE 25 MG/1
25 TABLET ORAL ONCE AS NEEDED
Status: DISCONTINUED | OUTPATIENT
Start: 2019-10-31 | End: 2019-10-31 | Stop reason: HOSPADM

## 2019-10-31 RX ORDER — LIDOCAINE HYDROCHLORIDE 10 MG/ML
INJECTION, SOLUTION EPIDURAL; INFILTRATION; INTRACAUDAL; PERINEURAL AS NEEDED
Status: DISCONTINUED | OUTPATIENT
Start: 2019-10-31 | End: 2019-10-31 | Stop reason: SURG

## 2019-10-31 RX ORDER — ASPIRIN 325 MG
325 TABLET, DELAYED RELEASE (ENTERIC COATED) ORAL DAILY
Qty: 14 TABLET | Refills: 0 | Status: SHIPPED | OUTPATIENT
Start: 2019-11-01 | End: 2021-11-15

## 2019-10-31 RX ORDER — CEFAZOLIN SODIUM 2 G/100ML
2 INJECTION, SOLUTION INTRAVENOUS ONCE
Status: COMPLETED | OUTPATIENT
Start: 2019-10-31 | End: 2019-10-31

## 2019-10-31 RX ORDER — DEXAMETHASONE SODIUM PHOSPHATE 10 MG/ML
INJECTION, SOLUTION INTRAMUSCULAR; INTRAVENOUS
Status: COMPLETED | OUTPATIENT
Start: 2019-10-31 | End: 2019-10-31

## 2019-10-31 RX ORDER — FAMOTIDINE 20 MG/1
20 TABLET, FILM COATED ORAL
Status: COMPLETED | OUTPATIENT
Start: 2019-10-31 | End: 2019-10-31

## 2019-10-31 RX ORDER — MAGNESIUM HYDROXIDE 1200 MG/15ML
LIQUID ORAL AS NEEDED
Status: DISCONTINUED | OUTPATIENT
Start: 2019-10-31 | End: 2019-10-31 | Stop reason: HOSPADM

## 2019-10-31 RX ORDER — BUPIVACAINE HYDROCHLORIDE 2.5 MG/ML
INJECTION, SOLUTION EPIDURAL; INFILTRATION; INTRACAUDAL
Status: DISCONTINUED | OUTPATIENT
Start: 2019-10-31 | End: 2019-10-31 | Stop reason: SURG

## 2019-10-31 RX ORDER — BUPRENORPHINE HYDROCHLORIDE 0.32 MG/ML
INJECTION INTRAMUSCULAR; INTRAVENOUS
Status: COMPLETED | OUTPATIENT
Start: 2019-10-31 | End: 2019-10-31

## 2019-10-31 RX ORDER — HYDROCODONE BITARTRATE AND ACETAMINOPHEN 7.5; 325 MG/1; MG/1
1-2 TABLET ORAL EVERY 4 HOURS PRN
Qty: 30 TABLET | Refills: 0 | OUTPATIENT
Start: 2019-10-31 | End: 2021-11-10

## 2019-10-31 RX ORDER — MEPERIDINE HYDROCHLORIDE 25 MG/ML
12.5 INJECTION INTRAMUSCULAR; INTRAVENOUS; SUBCUTANEOUS
Status: DISCONTINUED | OUTPATIENT
Start: 2019-10-31 | End: 2019-10-31 | Stop reason: HOSPADM

## 2019-10-31 RX ORDER — SODIUM CHLORIDE, SODIUM LACTATE, POTASSIUM CHLORIDE, CALCIUM CHLORIDE 600; 310; 30; 20 MG/100ML; MG/100ML; MG/100ML; MG/100ML
9 INJECTION, SOLUTION INTRAVENOUS CONTINUOUS PRN
Status: DISCONTINUED | OUTPATIENT
Start: 2019-10-31 | End: 2019-10-31 | Stop reason: HOSPADM

## 2019-10-31 RX ORDER — PROMETHAZINE HYDROCHLORIDE 25 MG/1
25 SUPPOSITORY RECTAL ONCE AS NEEDED
Status: DISCONTINUED | OUTPATIENT
Start: 2019-10-31 | End: 2019-10-31 | Stop reason: HOSPADM

## 2019-10-31 RX ORDER — DEXAMETHASONE SODIUM PHOSPHATE 4 MG/ML
INJECTION, SOLUTION INTRA-ARTICULAR; INTRALESIONAL; INTRAMUSCULAR; INTRAVENOUS; SOFT TISSUE AS NEEDED
Status: DISCONTINUED | OUTPATIENT
Start: 2019-10-31 | End: 2019-10-31 | Stop reason: SURG

## 2019-10-31 RX ORDER — FENTANYL CITRATE 50 UG/ML
INJECTION, SOLUTION INTRAMUSCULAR; INTRAVENOUS
Status: COMPLETED | OUTPATIENT
Start: 2019-10-31 | End: 2019-10-31

## 2019-10-31 RX ORDER — FENTANYL CITRATE 50 UG/ML
50 INJECTION, SOLUTION INTRAMUSCULAR; INTRAVENOUS
Status: DISCONTINUED | OUTPATIENT
Start: 2019-10-31 | End: 2019-10-31 | Stop reason: HOSPADM

## 2019-10-31 RX ADMIN — FENTANYL CITRATE 50 MCG: 0.05 INJECTION, SOLUTION INTRAMUSCULAR; INTRAVENOUS at 10:54

## 2019-10-31 RX ADMIN — DEXAMETHASONE SODIUM PHOSPHATE 2 MG: 10 INJECTION INTRAMUSCULAR; INTRAVENOUS at 07:26

## 2019-10-31 RX ADMIN — LIDOCAINE HYDROCHLORIDE 50 MG: 10 INJECTION, SOLUTION EPIDURAL; INFILTRATION; INTRACAUDAL; PERINEURAL at 07:43

## 2019-10-31 RX ADMIN — FENTANYL CITRATE 50 MCG: 0.05 INJECTION, SOLUTION INTRAMUSCULAR; INTRAVENOUS at 10:44

## 2019-10-31 RX ADMIN — LIDOCAINE HYDROCHLORIDE 0.2 ML: 10 INJECTION, SOLUTION EPIDURAL; INFILTRATION; INTRACAUDAL; PERINEURAL at 06:45

## 2019-10-31 RX ADMIN — BUPIVACAINE HYDROCHLORIDE 20 ML: 2.5 INJECTION, SOLUTION EPIDURAL; INFILTRATION; INTRACAUDAL; PERINEURAL at 10:25

## 2019-10-31 RX ADMIN — SODIUM CHLORIDE, POTASSIUM CHLORIDE, SODIUM LACTATE AND CALCIUM CHLORIDE: 600; 310; 30; 20 INJECTION, SOLUTION INTRAVENOUS at 09:13

## 2019-10-31 RX ADMIN — FENTANYL CITRATE 100 MCG: 50 INJECTION, SOLUTION INTRAMUSCULAR; INTRAVENOUS at 07:20

## 2019-10-31 RX ADMIN — PROPOFOL 150 MG: 10 INJECTION, EMULSION INTRAVENOUS at 07:43

## 2019-10-31 RX ADMIN — SODIUM CHLORIDE, POTASSIUM CHLORIDE, SODIUM LACTATE AND CALCIUM CHLORIDE 9 ML/HR: 600; 310; 30; 20 INJECTION, SOLUTION INTRAVENOUS at 06:45

## 2019-10-31 RX ADMIN — BUPIVACAINE HYDROCHLORIDE 20 ML: 2.5 INJECTION, SOLUTION EPIDURAL; INFILTRATION; INTRACAUDAL; PERINEURAL at 07:20

## 2019-10-31 RX ADMIN — ONDANSETRON 4 MG: 2 INJECTION INTRAMUSCULAR; INTRAVENOUS at 10:01

## 2019-10-31 RX ADMIN — BUPIVACAINE HYDROCHLORIDE 30 ML: 2.5 INJECTION, SOLUTION EPIDURAL; INFILTRATION; INTRACAUDAL; PERINEURAL at 07:26

## 2019-10-31 RX ADMIN — CEFAZOLIN SODIUM 2 G: 2 INJECTION, SOLUTION INTRAVENOUS at 07:38

## 2019-10-31 RX ADMIN — BUPIVACAINE HYDROCHLORIDE 20 ML: 2.5 INJECTION, SOLUTION EPIDURAL; INFILTRATION; INTRACAUDAL; PERINEURAL at 12:31

## 2019-10-31 RX ADMIN — DEXAMETHASONE SODIUM PHOSPHATE 8 MG: 4 INJECTION, SOLUTION INTRAMUSCULAR; INTRAVENOUS at 07:46

## 2019-10-31 RX ADMIN — FAMOTIDINE 20 MG: 20 TABLET ORAL at 06:47

## 2019-10-31 RX ADMIN — MIDAZOLAM HYDROCHLORIDE 2 MG: 1 INJECTION, SOLUTION INTRAMUSCULAR; INTRAVENOUS at 07:20

## 2019-10-31 RX ADMIN — BUPRENORPHINE HYDROCHLORIDE 0.3 MG: 0.32 INJECTION INTRAMUSCULAR; INTRAVENOUS at 07:26

## 2019-10-31 RX ADMIN — ROPIVACAINE HYDROCHLORIDE 10 ML/HR: 5 INJECTION, SOLUTION EPIDURAL; INFILTRATION; PERINEURAL at 10:43

## 2019-10-31 NOTE — ANESTHESIA POSTPROCEDURE EVALUATION
Patient: Vania Garcia    Procedure Summary     Date:  10/31/19 Room / Location:   [facility]    Anesthesia Start:  0738 Anesthesia Stop:  1050    Procedure:  KNEE ANTERIOR CRUCIATE LIGAMENT RECONSTRUCTION WITH AUTOGRAFT HAMSTRINGS, ALLOGRAFT (Right Knee) Diagnosis:       New ACL tear, right, initial encounter      (right knee ACL tear)    Surgeon:  Quang Alex MD Provider:  Solitario Brizuela MD    Anesthesia Type:  general with block ASA Status:  2          Anesthesia Type: general with block  Last vitals  BP   117/82 (10/31/19 1045)   Temp   97.2 °F (36.2 °C) (10/31/19 1020)   Pulse   82 (10/31/19 1045)   Resp   16 (10/31/19 1045)     SpO2   97 % (10/31/19 1045)     Post Anesthesia Care and Evaluation    Patient location during evaluation: PACU  Patient participation: complete - patient participated  Level of consciousness: awake and alert  Pain score: 0  Pain management: adequate  Airway patency: patent  Anesthetic complications: No anesthetic complications  PONV Status: none  Cardiovascular status: hemodynamically stable and acceptable  Respiratory status: nonlabored ventilation, acceptable and nasal cannula  Hydration status: acceptable

## 2019-10-31 NOTE — ANESTHESIA PROCEDURE NOTES
IPACK      Patient reassessed immediately prior to procedure    Patient location during procedure: OR  Reason for block: at surgeon's request and post-op pain management  Performed by  CRNA: Lydia Victoria CRNA  Assisted by: Vania Daigle CRNA  Preanesthetic Checklist  Completed: patient identified, site marked, surgical consent, pre-op evaluation, timeout performed, IV checked, risks and benefits discussed and monitors and equipment checked  Prep:  Pt Position: supine  Sterile barriers:cap, gloves, mask and sterile barriers  Prep: ChloraPrep  Patient monitoring: blood pressure monitoring, continuous pulse oximetry and EKG  Procedure  Sedation:yes  Performed under: local infiltration  Guidance:ultrasound guided  Images:still images obtained, printed/placed on chart    Laterality:rightInjection Technique:single-shot  Needle Type:echogenic and short-bevel  Needle Gauge:21 G  Resistance on Injection: none    Medications Used: dexamethasone sodium phosphate injection, 2 mg  buprenorphine (BUPRENEX) injection, 0.3 mg  bupivacaine PF (MARCAINE) 0.25 % injection, 30 mL  Med admintered at 10/31/2019 7:26 AM      Medications  Comment:                       Post Assessment  Injection Assessment: negative aspiration for heme, no paresthesia on injection and incremental injection  Patient Tolerance:comfortable throughout block  Complications:no  Additional Notes  The pt was placed in  lateral position.   The pt was anesthetized with  IV Sedation( see meds).  With the use of ultrasound guidance, the popliteal artery was visualized above the shaft of the femur, the space between the popliteal artery and the femur was targeted for this procedure. The Insertion site was prepped in sterile fashion.  Skin and cutaneous tissue was infiltrated and anesthetized with 1% Lidocaine 3 mls via a 25g needle.  A BBraun 4 inch 20g echogenic needle was then  inserted approximately 2 cm proximal to the popliteal james a at the lateral mid  biceps femoris and advanced In-plane with Ultrasound guidance.. Normal Saline PSF was utilized for hydrodissection of tissue.   LA injection spread was visualized between the popliteal artery and femur filling this space from medial to lateral,  injection was incremental 1-5ml, injection pressure was normal or little, no intraneural injection, no vascular injection.  Throughout the injection of the LA solution the sciatic nerve components were visualized, care was taken to keep any spread of the LA solution from reaching the sciatic nerve components. Thank you

## 2019-10-31 NOTE — ANESTHESIA PROCEDURE NOTES
Reblock - Adductor Catheter      Patient reassessed immediately prior to procedure    Patient location during procedure: post-op  Reason for block: at surgeon's request and post-op pain management  Performed by  CRNA: Lydia Victoria CRNA  Assisted by: Susan Ragland RN  Preanesthetic Checklist  Completed: patient identified, site marked, surgical consent, pre-op evaluation, timeout performed, IV checked, risks and benefits discussed and monitors and equipment checked  Prep:  Pt Position: supine  Sterile barriers:cap, gloves, mask and sterile barriers  Prep: ChloraPrep  Patient monitoring: blood pressure monitoring, continuous pulse oximetry and EKG  Procedure  Sedation:no  Performed under: spinal  Guidance:ultrasound guided  Images:still images obtained, printed/placed on chart    Laterality:right  Block Type:adductor canal block  Injection Technique:catheter  Needle Type:Tuohy and echogenic  Needle Gauge:18 G  Resistance on Injection: none  Catheter Size:20 G (20g)    Medications Used: bupivacaine PF (MARCAINE) 0.25 % injection, 20 mL  Med admintered at 10/31/2019 12:31 PM      Post Assessment  Injection Assessment: negative aspiration for heme, incremental injection and no paresthesia on injection  Patient Tolerance:comfortable throughout block  Complications:no  Additional Notes  Procedure:             The pt was placed in the Supine position.  The Insertion site was  prepped and Draped in sterile fashion.  The pt was anesthetized with  IV Sedation( see meds).  Skin and cutaneous tissue was infiltrated and anesthetized with 1% Lidocaine 3 mls via a 25g needle.  A BBraun 4 inch 18g echogenic needle was then  inserted approximately midline, mid-thigh and advanced In-plane with Ultrasound guidance.  Normal Saline PSF was utilized for hydrodissection of tissue.  The Vastus medialis and Sartorius muscle where visualized and the needle tip was placed in the adductor canal,  lateral to the femoral artery.  LA  injection spread was visualized, injection was incremental 1-5ml, injection pressure was normal or little, no intraneural injection, no vascular injection.  LA dose was injected thru the needle(see dose above).  A BBraun 20g wire stylet catheter was placed via the needle with ultrasound visualization and confirmation with NS fluid bolus. The labeled Catheter was then secured to skin at insertion site with skin afix and steristrips to curled catheter and CHG transparent dressing.  Thank you.

## 2019-10-31 NOTE — ANESTHESIA PREPROCEDURE EVALUATION
Anesthesia Evaluation     Patient summary reviewed and Nursing notes reviewed   NPO Solid Status: > 8 hours  NPO Liquid Status: > 2 hours           Airway   Mallampati: I  TM distance: >3 FB  Neck ROM: full  No difficulty expected  Dental      Pulmonary     breath sounds clear to auscultation  (+) a smoker Current,   Cardiovascular     Rhythm: regular  Rate: normal        Neuro/Psych  GI/Hepatic/Renal/Endo    (+) obesity,       Musculoskeletal     Abdominal    Substance History   (+) alcohol use, drug use     OB/GYN          Other        ROS/Med Hx Other: Daily marijuana use                  Anesthesia Plan    ASA 2     general with block     intravenous induction   Anesthetic plan, all risks, benefits, and alternatives have been provided, discussed and informed consent has been obtained with: patient.    Plan discussed with CRNA.

## 2019-10-31 NOTE — ANESTHESIA PROCEDURE NOTES
Airway  Urgency: elective    Date/Time: 10/31/2019 7:43 AM  Airway not difficult    General Information and Staff    Patient location during procedure: OR  CRNA: Vania Daigle CRNA    Indications and Patient Condition  Indications for airway management: airway protection    Preoxygenated: yes  Mask difficulty assessment: 1 - vent by mask    Final Airway Details  Final airway type: supraglottic airway      Successful airway: classic  Size 4    Number of attempts at approach: 1    Additional Comments  LMA placed without difficulty, ventilation with assist, equal breath sounds and symmetric chest rise and fall

## 2019-10-31 NOTE — ADDENDUM NOTE
Addendum  created 10/31/19 1231 by Lydia Victoria CRNA    Child order released for a procedure order, Intraprocedure Blocks edited, Intraprocedure Event edited, LDA created via procedure documentation, LDA removed via procedure documentation, Sign clinical note

## 2019-10-31 NOTE — ADDENDUM NOTE
Addendum  created 10/31/19 1051 by Vania Daigle CRNA    Intraprocedure Event edited, Intraprocedure Staff edited

## 2019-10-31 NOTE — ANESTHESIA PROCEDURE NOTES
SS ADDUCTOR CANAL      Patient reassessed immediately prior to procedure    Patient location during procedure: pre-op  Reason for block: at surgeon's request and post-op pain management  Performed by  CRNA: Vania Daigle CRNA  Assisted by: Ryder Bassett CRNA  Preanesthetic Checklist  Completed: patient identified, site marked, surgical consent, pre-op evaluation, timeout performed, IV checked, risks and benefits discussed and monitors and equipment checked  Prep:  Pt Position: supine  Sterile barriers:cap, gloves, mask and sterile barriers  Prep: ChloraPrep  Patient monitoring: blood pressure monitoring, continuous pulse oximetry and EKG  Procedure  Performed under: spinal  Guidance:ultrasound guided  Images:still images obtained, printed/placed on chart    Laterality:right  Block Type:adductor canal block  Injection Technique:single-shot  Needle Type:Tuohy and echogenic  Needle Gauge:18 G  Resistance on Injection: none  Catheter Size:20 G (20g)    Medications Used: fentaNYL citrate (PF) (SUBLIMAZE) injection, 100 mcg  midazolam (VERSED) injection, 2 mg  bupivacaine PF (MARCAINE) 0.25 % injection, 20 mL  Med admintered at 10/31/2019 7:20 AM      Post Assessment  Injection Assessment: negative aspiration for heme, incremental injection and no paresthesia on injection  Patient Tolerance:comfortable throughout block  Complications:no  Additional Notes  Procedure:             The pt was placed in the Supine position.  The Insertion site was  prepped and Draped in sterile fashion.  The pt was anesthetized with  IV Sedation( see meds).  Skin and cutaneous tissue was infiltrated and anesthetized with 1% Lidocaine 3 mls via a 25g needle.  A BBraun 4 inch 18g echogenic needle was then  inserted approximately midline, mid-thigh and advanced In-plane with Ultrasound guidance.  Normal Saline PSF was utilized for hydrodissection of tissue.  The Vastus medialis and Sartorius muscle where visualized and the needle tip  was placed in the adductor canal,  lateral to the femoral artery.  LA injection spread was visualized, injection was incremental 1-5ml, injection pressure was normal or little, no intraneural injection, no vascular injection.  LA dose was injected thru the needle(see dose above).  A BBraun 20g wire stylet catheter was placed via the needle with ultrasound visualization and confirmation with NS fluid bolus. The labeled Catheter was then secured to skin at insertion site with skin afix and steristrips to curled catheter and CHG transparent dressing.  Thank you.

## 2019-10-31 NOTE — ANESTHESIA PROCEDURE NOTES
ADDUCTOR CANAL CATHETER      Patient reassessed immediately prior to procedure    Patient location during procedure: post-op  Reason for block: at surgeon's request and post-op pain management  Performed by  CRNA: Vania Daigle CRNA  Assisted by: Susan Ragland RN  Preanesthetic Checklist  Completed: patient identified, site marked, surgical consent, pre-op evaluation, timeout performed, IV checked, risks and benefits discussed and monitors and equipment checked  Prep:  Pt Position: supine  Sterile barriers:cap, gloves, mask and sterile barriers  Prep: ChloraPrep  Patient monitoring: blood pressure monitoring, continuous pulse oximetry and EKG  Procedure  Performed under: spinal  Guidance:ultrasound guided  Images:still images obtained, printed/placed on chart    Laterality:right  Block Type:adductor canal block  Injection Technique:catheter  Needle Type:Tuohy and echogenic  Needle Gauge:18 G  Resistance on Injection: none  Catheter Size:20 G (20g)    Medications Used: bupivacaine PF (MARCAINE) 0.25 % injection, 20 mL  Med admintered at 10/31/2019 10:25 AM      Post Assessment  Injection Assessment: negative aspiration for heme, incremental injection and no paresthesia on injection  Patient Tolerance:comfortable throughout block  Complications:no  Additional Notes  Procedure:             The pt was placed in the Supine position.  The Insertion site was  prepped and Draped in sterile fashion.  The pt was anesthetized with  IV Sedation( see meds).  Skin and cutaneous tissue was infiltrated and anesthetized with 1% Lidocaine 3 mls via a 25g needle.  A BBraun 4 inch 18g echogenic needle was then  inserted approximately midline, mid-thigh and advanced In-plane with Ultrasound guidance.  Normal Saline PSF was utilized for hydrodissection of tissue.  The Vastus medialis and Sartorius muscle where visualized and the needle tip was placed in the adductor canal,  lateral to the femoral artery.  LA injection spread  was visualized, injection was incremental 1-5ml, injection pressure was normal or little, no intraneural injection, no vascular injection.  LA dose was injected thru the needle(see dose above).  A BBraun 20g wire stylet catheter was placed via the needle with ultrasound visualization and confirmation with NS fluid bolus. The labeled Catheter was then secured to skin at insertion site with skin afix and steristrips to curled catheter and CHG transparent dressing.  Thank you.

## 2019-11-01 NOTE — PROGRESS NOTES
[facility]    Nerve Cath Post Op Call    Patient Name: Vania Garcia  :  1980  MRN:  [id number]  Date of Discharge: 10/31/2019    Nerve Cath Post Op Call:    Analgesia:Good  Pain Score:3/10  Side Effects:None  Catheter Site:clean  Patient Controlled ON Q pump infusion rate: 10ml/hr  Catheter Plan:Will continue with plan at home without changes and The patient was instructed to call ON CALL Anesthesia provider for any questions or problems

## 2020-11-10 ENCOUNTER — APPOINTMENT (OUTPATIENT)
Dept: GENERAL RADIOLOGY | Facility: HOSPITAL | Age: 40
End: 2020-11-10

## 2020-11-10 ENCOUNTER — HOSPITAL ENCOUNTER (EMERGENCY)
Facility: HOSPITAL | Age: 40
Discharge: HOME | End: 2020-11-10
Attending: EMERGENCY MEDICINE | Admitting: EMERGENCY MEDICINE

## 2020-11-10 VITALS
DIASTOLIC BLOOD PRESSURE: 68 MMHG | WEIGHT: 220 LBS | BODY MASS INDEX: 34.53 KG/M2 | HEART RATE: 75 BPM | HEIGHT: 67 IN | RESPIRATION RATE: 17 BRPM | SYSTOLIC BLOOD PRESSURE: 124 MMHG | TEMPERATURE: 98.6 F | OXYGEN SATURATION: 100 %

## 2020-11-10 DIAGNOSIS — R07.2 PRECORDIAL PAIN: Primary | ICD-10-CM

## 2020-11-10 LAB
ALBUMIN SERPL-MCNC: 4.4 G/DL (ref 3.5–5.2)
ALBUMIN/GLOB SERPL: 1.8 G/DL
ALP SERPL-CCNC: 65 U/L (ref 39–117)
ALT SERPL W P-5'-P-CCNC: 21 U/L (ref 1–33)
ANION GAP SERPL CALCULATED.3IONS-SCNC: 11 MMOL/L (ref 5–15)
AST SERPL-CCNC: 15 U/L (ref 1–32)
B-HCG UR QL: NEGATIVE
BASOPHILS # BLD AUTO: 0.11 10*3/MM3 (ref 0–0.2)
BASOPHILS NFR BLD AUTO: 1 % (ref 0–1.5)
BILIRUB SERPL-MCNC: 0.4 MG/DL (ref 0–1.2)
BUN SERPL-MCNC: 8 MG/DL (ref 6–20)
BUN/CREAT SERPL: 10.3 (ref 7–25)
CALCIUM SPEC-SCNC: 9.2 MG/DL (ref 8.6–10.5)
CHLORIDE SERPL-SCNC: 105 MMOL/L (ref 98–107)
CO2 SERPL-SCNC: 25 MMOL/L (ref 22–29)
CREAT SERPL-MCNC: 0.78 MG/DL (ref 0.57–1)
DEPRECATED RDW RBC AUTO: 47.1 FL (ref 37–54)
EOSINOPHIL # BLD AUTO: 0.06 10*3/MM3 (ref 0–0.4)
EOSINOPHIL NFR BLD AUTO: 0.5 % (ref 0.3–6.2)
ERYTHROCYTE [DISTWIDTH] IN BLOOD BY AUTOMATED COUNT: 12.4 % (ref 12.3–15.4)
GFR SERPL CREATININE-BSD FRML MDRD: 82 ML/MIN/1.73
GLOBULIN UR ELPH-MCNC: 2.4 GM/DL
GLUCOSE SERPL-MCNC: 102 MG/DL (ref 65–99)
HCT VFR BLD AUTO: 41.1 % (ref 34–46.6)
HGB BLD-MCNC: 13.7 G/DL (ref 12–15.9)
HOLD SPECIMEN: NORMAL
HOLD SPECIMEN: NORMAL
IMM GRANULOCYTES # BLD AUTO: 0.04 10*3/MM3 (ref 0–0.05)
IMM GRANULOCYTES NFR BLD AUTO: 0.4 % (ref 0–0.5)
INTERNAL NEGATIVE CONTROL: NEGATIVE
INTERNAL POSITIVE CONTROL: POSITIVE
LIPASE SERPL-CCNC: 17 U/L (ref 13–60)
LYMPHOCYTES # BLD AUTO: 2.1 10*3/MM3 (ref 0.7–3.1)
LYMPHOCYTES NFR BLD AUTO: 18.6 % (ref 19.6–45.3)
Lab: NORMAL
MCH RBC QN AUTO: 34.1 PG (ref 26.6–33)
MCHC RBC AUTO-ENTMCNC: 33.3 G/DL (ref 31.5–35.7)
MCV RBC AUTO: 102.2 FL (ref 79–97)
MONOCYTES # BLD AUTO: 0.59 10*3/MM3 (ref 0.1–0.9)
MONOCYTES NFR BLD AUTO: 5.2 % (ref 5–12)
NEUTROPHILS NFR BLD AUTO: 74.3 % (ref 42.7–76)
NEUTROPHILS NFR BLD AUTO: 8.37 10*3/MM3 (ref 1.7–7)
NRBC BLD AUTO-RTO: 0 /100 WBC (ref 0–0.2)
NT-PROBNP SERPL-MCNC: 138.9 PG/ML (ref 0–450)
PLATELET # BLD AUTO: 304 10*3/MM3 (ref 140–450)
PMV BLD AUTO: 12 FL (ref 6–12)
POTASSIUM SERPL-SCNC: 4.2 MMOL/L (ref 3.5–5.2)
PROT SERPL-MCNC: 6.8 G/DL (ref 6–8.5)
RBC # BLD AUTO: 4.02 10*6/MM3 (ref 3.77–5.28)
SODIUM SERPL-SCNC: 141 MMOL/L (ref 136–145)
TROPONIN T SERPL-MCNC: <0.01 NG/ML (ref 0–0.03)
TROPONIN T SERPL-MCNC: <0.01 NG/ML (ref 0–0.03)
WBC # BLD AUTO: 11.27 10*3/MM3 (ref 3.4–10.8)
WHOLE BLOOD HOLD SPECIMEN: NORMAL
WHOLE BLOOD HOLD SPECIMEN: NORMAL

## 2020-11-10 PROCEDURE — 81025 URINE PREGNANCY TEST: CPT | Performed by: EMERGENCY MEDICINE

## 2020-11-10 PROCEDURE — 93005 ELECTROCARDIOGRAM TRACING: CPT

## 2020-11-10 PROCEDURE — 80053 COMPREHEN METABOLIC PANEL: CPT

## 2020-11-10 PROCEDURE — 71046 X-RAY EXAM CHEST 2 VIEWS: CPT

## 2020-11-10 PROCEDURE — 84484 ASSAY OF TROPONIN QUANT: CPT | Performed by: EMERGENCY MEDICINE

## 2020-11-10 PROCEDURE — 83880 ASSAY OF NATRIURETIC PEPTIDE: CPT

## 2020-11-10 PROCEDURE — 99284 EMERGENCY DEPT VISIT MOD MDM: CPT

## 2020-11-10 PROCEDURE — 83690 ASSAY OF LIPASE: CPT

## 2020-11-10 PROCEDURE — 36415 COLL VENOUS BLD VENIPUNCTURE: CPT

## 2020-11-10 PROCEDURE — 93005 ELECTROCARDIOGRAM TRACING: CPT | Performed by: EMERGENCY MEDICINE

## 2020-11-10 PROCEDURE — 85025 COMPLETE CBC W/AUTO DIFF WBC: CPT | Performed by: EMERGENCY MEDICINE

## 2020-11-10 PROCEDURE — 99283 EMERGENCY DEPT VISIT LOW MDM: CPT

## 2020-11-10 RX ORDER — ASPIRIN 81 MG/1
324 TABLET, CHEWABLE ORAL ONCE
Status: COMPLETED | OUTPATIENT
Start: 2020-11-10 | End: 2020-11-10

## 2020-11-10 RX ORDER — SODIUM CHLORIDE 0.9 % (FLUSH) 0.9 %
10 SYRINGE (ML) INJECTION AS NEEDED
Status: DISCONTINUED | OUTPATIENT
Start: 2020-11-10 | End: 2020-11-10 | Stop reason: HOSPADM

## 2020-11-10 RX ADMIN — ASPIRIN 324 MG: 81 TABLET, CHEWABLE ORAL at 17:11

## 2020-11-11 NOTE — ED PROVIDER NOTES
Subjective   40-year-old female presents with a complaint of chest pain.  She describes it as in the center of the chest.  It does not radiate into the neck, shoulders, or into the abdomen.  The pain is described as constant in nature.  She denies previous history of chest pain like this in the past.  Symptoms have been present for approximate last week.  It is not worse with activity or movement.  It does not hurt to press over that area.  She denies any rash to the area, or any kind of trauma to her chest.  No report abdominal pain, nausea or vomiting, no change in bowel or urinary function.  She denies any acute respiratory symptoms.  No infectious symptoms including no fever, bodies, or chills.  She is a smoker, and is obese.  Not a diabetic, no history of high blood pressure or high cholesterol.  No significant family history of coronary artery disease at young age.          Review of Systems   Constitutional: Negative for chills, fatigue and fever.   HENT: Negative for congestion, ear pain, postnasal drip, sinus pressure and sore throat.    Eyes: Negative for pain, redness and visual disturbance.   Respiratory: Negative for cough, chest tightness and shortness of breath.    Cardiovascular: Positive for chest pain. Negative for palpitations and leg swelling.   Gastrointestinal: Negative for abdominal pain, anal bleeding, blood in stool, diarrhea, nausea and vomiting.   Endocrine: Negative for polydipsia and polyuria.   Genitourinary: Negative for difficulty urinating, dysuria, frequency and urgency.   Musculoskeletal: Negative for arthralgias, back pain and neck pain.   Skin: Negative for pallor and rash.   Allergic/Immunologic: Negative for environmental allergies and immunocompromised state.   Neurological: Negative for dizziness, weakness and headaches.   Hematological: Negative for adenopathy.   Psychiatric/Behavioral: Negative for confusion, self-injury and suicidal ideas. The patient is not  nervous/anxious.    All other systems reviewed and are negative.      No past medical history on file.    No Known Allergies    Past Surgical History:   Procedure Laterality Date   • ANKLE SURGERY Right    • KNEE ACL RECONSTRUCTION Right 10/31/2019    Procedure: KNEE ANTERIOR CRUCIATE LIGAMENT RECONSTRUCTION WITH AUTOGRAFT HAMSTRINGS RIGHT, ALLOGRAFT;  Surgeon: Quang Alex MD;  Location: [facility];  Service: Orthopedics   • WISDOM TOOTH EXTRACTION         Family History   Problem Relation Age of Onset   • Breast cancer Neg Hx    • Ovarian cancer Neg Hx    • Colon cancer Neg Hx    • Diabetes Neg Hx        Social History     Socioeconomic History   • Marital status: Single     Spouse name: Not on file   • Number of children: Not on file   • Years of education: Not on file   • Highest education level: Not on file   Tobacco Use   • Smoking status: Current Every Day Smoker     Packs/day: 1.00     Years: 26.00     Pack years: 26.00     Types: Cigarettes   • Smokeless tobacco: Never Used   Substance and Sexual Activity   • Alcohol use: Yes     Alcohol/week: 3.0 standard drinks     Types: 3 Shots of liquor per week   • Drug use: Yes     Types: Marijuana   • Sexual activity: Defer           Objective   Physical Exam  Vitals signs and nursing note reviewed.   Constitutional:       General: She is not in acute distress.     Appearance: Normal appearance. She is well-developed. She is not toxic-appearing or diaphoretic.   HENT:      Head: Normocephalic and atraumatic.      Right Ear: External ear normal.      Left Ear: External ear normal.      Nose: Nose normal.   Eyes:      General: Lids are normal.      Pupils: Pupils are equal, round, and reactive to light.   Neck:      Musculoskeletal: Normal range of motion and neck supple.      Trachea: No tracheal deviation.   Cardiovascular:      Rate and Rhythm: Normal rate and regular rhythm.      Pulses: No decreased pulses.      Heart sounds: Normal heart sounds. No  murmur. No friction rub. No gallop.    Pulmonary:      Effort: No tachypnea, accessory muscle usage or respiratory distress.      Breath sounds: Normal breath sounds. No decreased breath sounds, wheezing, rhonchi or rales.       Chest:      Chest wall: No mass, lacerations, deformity, swelling or tenderness.       Abdominal:      General: Bowel sounds are normal.      Palpations: Abdomen is soft.      Tenderness: There is no abdominal tenderness. There is no guarding or rebound.   Musculoskeletal: Normal range of motion.         General: No deformity.   Lymphadenopathy:      Cervical: No cervical adenopathy.   Skin:     General: Skin is warm and dry.      Findings: No rash.   Neurological:      Mental Status: She is alert and oriented to person, place, and time.      Cranial Nerves: No cranial nerve deficit.      Sensory: No sensory deficit.   Psychiatric:         Speech: Speech normal.         Behavior: Behavior normal.         Thought Content: Thought content normal.         Judgment: Judgment normal.         Procedures           ED Course                                           MDM  Number of Diagnoses or Management Options  Precordial pain:   Diagnosis management comments: Negative work-up here in the ER.  EKG does not show any ischemic changes, troponin is normal, chest x-ray normal.  Vital signs have remained normal.    She is a smoker, obese, and middle-aged.  Because of this she will be referred to cardiology for further outpatient evaluation.    Heart score 1    Advised to avoid fatty, acidic, or spicy foods.  Sleep with head elevated.  Avoid eating late at night.  Take Pepcid as prescribed.       Amount and/or Complexity of Data Reviewed  Clinical lab tests: ordered and reviewed  Tests in the radiology section of CPT®: ordered and reviewed  Decide to obtain previous medical records or to obtain history from someone other than the patient: yes  Review and summarize past medical records: yes  Independent  visualization of images, tracings, or specimens: yes        Final diagnoses:   Precordial pain            Lucien Thurston MD  11/10/20 1955

## 2020-11-11 NOTE — DISCHARGE INSTRUCTIONS
Avoid fatty, acidic, or spicy foods.  Drink plenty of fluids.  Do not eat for at least 3 hours before going to sleep.  Sleep with your head elevated.  Take Pepcid as prescribed.    Follow-up with cardiology for further outpatient evaluation of chest pain.    Return to the ER with any further concern.

## 2020-11-13 ENCOUNTER — TELEPHONE (OUTPATIENT)
Dept: CARDIOLOGY | Facility: HOSPITAL | Age: 40
End: 2020-11-13

## 2020-11-13 NOTE — TELEPHONE ENCOUNTER
Patient was referred to the Heart [facility] Vascular by the [facility] ER.  Several attempts have been made to contact the pt with no success. Letter was mailed to pt to contact the office to schedule.

## 2020-11-17 LAB
QT INTERVAL: 402 MS
QTC INTERVAL: 472 MS

## 2021-11-15 ENCOUNTER — OFFICE VISIT (OUTPATIENT)
Dept: FAMILY MEDICINE CLINIC | Facility: CLINIC | Age: 41
End: 2021-11-15

## 2021-11-15 ENCOUNTER — LAB (OUTPATIENT)
Dept: LAB | Facility: HOSPITAL | Age: 41
End: 2021-11-15

## 2021-11-15 VITALS
BODY MASS INDEX: 34.53 KG/M2 | WEIGHT: 220 LBS | SYSTOLIC BLOOD PRESSURE: 130 MMHG | HEART RATE: 84 BPM | OXYGEN SATURATION: 98 % | DIASTOLIC BLOOD PRESSURE: 84 MMHG | HEIGHT: 67 IN

## 2021-11-15 DIAGNOSIS — R59.0 LAD (LYMPHADENOPATHY) OF LEFT CERVICAL REGION: ICD-10-CM

## 2021-11-15 DIAGNOSIS — F10.20 ALCOHOLISM (HCC): ICD-10-CM

## 2021-11-15 DIAGNOSIS — R05.3 CHRONIC COUGH: ICD-10-CM

## 2021-11-15 DIAGNOSIS — Z11.3 SCREENING EXAMINATION FOR STD (SEXUALLY TRANSMITTED DISEASE): ICD-10-CM

## 2021-11-15 DIAGNOSIS — L70.0 ACNE VULGARIS: ICD-10-CM

## 2021-11-15 DIAGNOSIS — Z72.0 TOBACCO USE: ICD-10-CM

## 2021-11-15 DIAGNOSIS — J30.2 SEASONAL ALLERGIC RHINITIS, UNSPECIFIED TRIGGER: ICD-10-CM

## 2021-11-15 DIAGNOSIS — E66.9 OBESITY (BMI 30.0-34.9): ICD-10-CM

## 2021-11-15 DIAGNOSIS — Z00.00 ENCOUNTER FOR MEDICAL EXAMINATION TO ESTABLISH CARE: Primary | ICD-10-CM

## 2021-11-15 PROBLEM — Z78.9 ALCOHOL USE: Status: ACTIVE | Noted: 2021-11-15

## 2021-11-15 PROBLEM — E66.811 OBESITY (BMI 30.0-34.9): Status: ACTIVE | Noted: 2021-11-15

## 2021-11-15 LAB
BASOPHILS # BLD AUTO: 0.07 10*3/MM3 (ref 0–0.2)
BASOPHILS NFR BLD AUTO: 1 % (ref 0–1.5)
DEPRECATED RDW RBC AUTO: 42.3 FL (ref 37–54)
EOSINOPHIL # BLD AUTO: 0.11 10*3/MM3 (ref 0–0.4)
EOSINOPHIL NFR BLD AUTO: 1.5 % (ref 0.3–6.2)
ERYTHROCYTE [DISTWIDTH] IN BLOOD BY AUTOMATED COUNT: 11.7 % (ref 12.3–15.4)
HCT VFR BLD AUTO: 44.1 % (ref 34–46.6)
HGB BLD-MCNC: 14.8 G/DL (ref 12–15.9)
IMM GRANULOCYTES # BLD AUTO: 0.01 10*3/MM3 (ref 0–0.05)
IMM GRANULOCYTES NFR BLD AUTO: 0.1 % (ref 0–0.5)
LYMPHOCYTES # BLD AUTO: 1.49 10*3/MM3 (ref 0.7–3.1)
LYMPHOCYTES NFR BLD AUTO: 20.9 % (ref 19.6–45.3)
MCH RBC QN AUTO: 32.9 PG (ref 26.6–33)
MCHC RBC AUTO-ENTMCNC: 33.6 G/DL (ref 31.5–35.7)
MCV RBC AUTO: 98 FL (ref 79–97)
MONOCYTES # BLD AUTO: 0.65 10*3/MM3 (ref 0.1–0.9)
MONOCYTES NFR BLD AUTO: 9.1 % (ref 5–12)
NEUTROPHILS NFR BLD AUTO: 4.79 10*3/MM3 (ref 1.7–7)
NEUTROPHILS NFR BLD AUTO: 67.4 % (ref 42.7–76)
NRBC BLD AUTO-RTO: 0 /100 WBC (ref 0–0.2)
PLATELET # BLD AUTO: 315 10*3/MM3 (ref 140–450)
PMV BLD AUTO: 12.8 FL (ref 6–12)
RBC # BLD AUTO: 4.5 10*6/MM3 (ref 3.77–5.28)
RPR SER QL: NORMAL
WBC # BLD AUTO: 7.12 10*3/MM3 (ref 3.4–10.8)

## 2021-11-15 PROCEDURE — 87491 CHLMYD TRACH DNA AMP PROBE: CPT

## 2021-11-15 PROCEDURE — 36415 COLL VENOUS BLD VENIPUNCTURE: CPT

## 2021-11-15 PROCEDURE — 86592 SYPHILIS TEST NON-TREP QUAL: CPT

## 2021-11-15 PROCEDURE — 99204 OFFICE O/P NEW MOD 45 MIN: CPT | Performed by: STUDENT IN AN ORGANIZED HEALTH CARE EDUCATION/TRAINING PROGRAM

## 2021-11-15 PROCEDURE — 87661 TRICHOMONAS VAGINALIS AMPLIF: CPT

## 2021-11-15 PROCEDURE — G0432 EIA HIV-1/HIV-2 SCREEN: HCPCS

## 2021-11-15 PROCEDURE — 87591 N.GONORRHOEAE DNA AMP PROB: CPT

## 2021-11-15 PROCEDURE — 85025 COMPLETE CBC W/AUTO DIFF WBC: CPT

## 2021-11-15 RX ORDER — NICOTINE 21 MG/24HR
1 PATCH, TRANSDERMAL 24 HOURS TRANSDERMAL EVERY 24 HOURS
Qty: 14 PATCH | Refills: 0 | Status: SHIPPED | OUTPATIENT
Start: 2021-11-15 | End: 2022-03-10

## 2021-11-15 RX ORDER — LORATADINE 10 MG/1
10 TABLET ORAL DAILY
Qty: 30 TABLET | Refills: 2 | Status: SHIPPED | OUTPATIENT
Start: 2021-11-15 | End: 2022-07-22 | Stop reason: SDUPTHER

## 2021-11-15 RX ORDER — NICOTINE 21 MG/24HR
1 PATCH, TRANSDERMAL 24 HOURS TRANSDERMAL EVERY 24 HOURS
Qty: 14 PATCH | Refills: 0 | Status: SHIPPED | OUTPATIENT
Start: 2021-11-29 | End: 2022-03-10

## 2021-11-15 RX ORDER — FLUTICASONE PROPIONATE 50 MCG
1 SPRAY, SUSPENSION (ML) NASAL DAILY
Qty: 9.9 ML | Refills: 11 | Status: SHIPPED | OUTPATIENT
Start: 2021-11-15 | End: 2022-07-22 | Stop reason: SDUPTHER

## 2021-11-15 RX ORDER — BENZOYL PEROXIDE 50 MG/ML
LIQUID TOPICAL 2 TIMES DAILY
Qty: 142 G | Refills: 12 | Status: SHIPPED | OUTPATIENT
Start: 2021-11-15 | End: 2023-03-02

## 2021-11-15 NOTE — ASSESSMENT & PLAN NOTE
-27 pack years, willing to quit. Failed chantix in the past.  Counseled on importance of cessation and health risks associated with continued smoking such as increased risk of CV event, malignancy, pulmonary disease, infection. She expressed understanding. Will try 6 weeks of Nicoderm patches.

## 2021-11-15 NOTE — PROGRESS NOTES
New Patient Office Visit      Patient Name: Vania Garcia  : 1980   MRN: [id number]   Care Team: Patient Care Team:  Olinda Lowe MD as PCP - General (Internal Medicine)  Provider, No Known as PCP - Family Medicine  Igor Colón MD as Obstetrician (Obstetrics and Gynecology)    Chief Complaint:    Chief Complaint   Patient presents with   • Establish Care   • Edema     swollen lymph nodes. UC visit.    • Rash       History of Present Illness: Vania Garcia is a 41 y.o. female who is here today to establish care.  She works as a  at [employer]. Feeling okay today, has several concerns as detailed below.    Tobacco use - started 27 years ago, 1ppd.. Smokes more when she is drinking. Interested in quitting. Has tried chantix before but not successful.     Alcohol use - Drinks alcohol most days of the week (usually a couple beers/glass of wine per night but binge drinks 1 pink of liquor about twice weekly) Interested in cutting back because she knows it would be good for her health. Denies withdrawal. Set goal of decreasing liquor to once weekly and cutting out beer/wine some days throughout the week    Plantar fascitis - follows with sports medicine for platar injections with significant relief. No pain today.     Acne - ongoing for years, frustrated by persistent flares on forehead and Tzone. Has tried OTC steroid cream with some relief. No relief with trial of metrogel.    Lymph node swelling - left cervical node swelling, coming and going x4 weeks. Usually lasts 3 days before self resolving with cold compress and ibuprofen. Admits to associated congestion and left ear fullness. Denies fever, nightsweats, nausea, vomiting, unintentional weight loss.     Sexually active - requests STD screening. Active with new partner, unsure of his status. Denies vagina discharge or lesions.     Chronic cough - ongoing for years, unchanged. Intermittently productive. Worse throughout  the night (wakes up coughing) and in the morning. Hears herself wheezing at times. No known history of asthma/copd although she was given an albuterol inhaler at one point years ago which improved symptoms.       Subjective      Review of Systems:   Review of Systems - See HPI    Past Medical History: No past medical history on file.    Past Surgical History:   Past Surgical History:   Procedure Laterality Date   • ANKLE SURGERY Right    • KNEE ACL RECONSTRUCTION Right 10/31/2019    Procedure: KNEE ANTERIOR CRUCIATE LIGAMENT RECONSTRUCTION WITH AUTOGRAFT HAMSTRINGS RIGHT, ALLOGRAFT;  Surgeon: Quang Alex MD;  Location: [facility];  Service: Orthopedics   • WISDOM TOOTH EXTRACTION         Family History:   Family History   Problem Relation Age of Onset   • Breast cancer Neg Hx    • Ovarian cancer Neg Hx    • Colon cancer Neg Hx    • Diabetes Neg Hx        Social History:   Social History     Socioeconomic History   • Marital status: Single   Tobacco Use   • Smoking status: Current Every Day Smoker     Packs/day: 1.00     Years: 26.00     Pack years: 26.00     Types: Cigarettes   • Smokeless tobacco: Never Used   Vaping Use   • Vaping Use: Never used   Substance and Sexual Activity   • Alcohol use: Yes     Alcohol/week: 3.0 standard drinks     Types: 3 Shots of liquor per week   • Drug use: Yes     Types: Marijuana   • Sexual activity: Defer       Tobacco History:   Social History     Tobacco Use   Smoking Status Current Every Day Smoker   • Packs/day: 1.00   • Years: 26.00   • Pack years: 26.00   • Types: Cigarettes   Smokeless Tobacco Never Used       Medications:     Current Outpatient Medications:   •  benzoyl peroxide (benzoyl peroxide) 5 % external liquid, Apply  topically to the appropriate area as directed 2 (Two) Times a Day., Disp: 142 g, Rfl: 12  •  fluticasone (Flonase) 50 MCG/ACT nasal spray, 1 spray into the nostril(s) as directed by provider Daily., Disp: 9.9 mL, Rfl: 11  •  loratadine  "(Claritin) 10 MG tablet, Take 1 tablet by mouth Daily., Disp: 30 tablet, Rfl: 2  •  [START ON 11/29/2021] nicotine (Nicoderm CQ) 14 MG/24HR patch, Place 1 patch on the skin as directed by provider Daily., Disp: 14 patch, Rfl: 0  •  nicotine (Nicoderm CQ) 21 MG/24HR patch, Place 1 patch on the skin as directed by provider Daily., Disp: 14 patch, Rfl: 0  •  [START ON 12/13/2021] nicotine (Nicoderm CQ) 7 MG/24HR patch, Place 1 patch on the skin as directed by provider Daily., Disp: 14 patch, Rfl: 0    Allergies:   No Known Allergies    Objective     Physical Exam:  Vital Signs:   Vitals:    11/15/21 1404   BP: 130/84   Pulse: 84   SpO2: 98%   Weight: 99.8 kg (220 lb)   Height: 170.2 cm (67\")     Body mass index is 34.46 kg/m².     Physical Exam    Assessment / Plan      Assessment/Plan:   Problems Addressed This Visit  Diagnoses and all orders for this visit:    1. Encounter for medical examination to establish care (Primary)    2. Tobacco use  Assessment & Plan:  -27 pack years, willing to quit. Failed chantix in the past.  Counseled on importance of cessation and health risks associated with continued smoking such as increased risk of CV event, malignancy, pulmonary disease, infection. She expressed understanding. Will try 6 weeks of Nicoderm patches.       3. Seasonal allergic rhinitis, unspecified trigger  Assessment & Plan:  -Uncontrolled, start claritin and flonase daily     Orders:  -     loratadine (Claritin) 10 MG tablet; Take 1 tablet by mouth Daily.  Dispense: 30 tablet; Refill: 2  -     fluticasone (Flonase) 50 MCG/ACT nasal spray; 1 spray into the nostril(s) as directed by provider Daily.  Dispense: 9.9 mL; Refill: 11    4. Chronic cough  Assessment & Plan:  Ddx: cough variant asthma, GERD, COPD, upper airway cough syndrome  -Will obtain PFTs  -Optimize allergic rhinitis with claritin and flonase   -Smoking cessation encouraged as detailed  -If PFTs unrevealing will trial PPI     Orders:  -     Full " Pulmonary Function Test With Bronchodilator; Future  -     COVID-19 PCR, [facility], NP SWAB IN [facility]AL TRANSPORT MEDIA/ORAL SWISH 24-30 HR TAT - Swab, Nasopharynx; Future    5. Screening examination for STD (sexually transmitted disease)  -     Chlamydia trachomatis, Neisseria gonorrhoeae, Trichomonas vaginalis, PCR - Urine, Urine, Clean Catch; Future  -     RPR; Future  -     HIV-1/O/2 Ag/Ab w Reflex; Future    6. LAD (lymphadenopathy) of left cervical region  Assessment & Plan:  Suspect associated with current congestion and upper respiratory symptoms. Will work towards optimizing allergic rhinitis. If LAD persists will proceed with imaging.    Orders:  -     CBC w AUTO Differential; Future    7. Alcoholism (HCC)  Assessment & Plan:  -Strongly encouraged cutting back. Educated on the health risks associated with heavy alcohol use.  Set goal limited liquor to one night weekly, avoiding binge drinking, and cutting back on beer/wine throughout the week       8. Acne vulgaris  -     benzoyl peroxide (benzoyl peroxide) 5 % external liquid; Apply  topically to the appropriate area as directed 2 (Two) Times a Day.  Dispense: 142 g; Refill: 12    9. Obesity (BMI 30.0-34.9)    Other orders  -     nicotine (Nicoderm CQ) 21 MG/24HR patch; Place 1 patch on the skin as directed by provider Daily.  Dispense: 14 patch; Refill: 0  -     nicotine (Nicoderm CQ) 14 MG/24HR patch; Place 1 patch on the skin as directed by provider Daily.  Dispense: 14 patch; Refill: 0  -     nicotine (Nicoderm CQ) 7 MG/24HR patch; Place 1 patch on the skin as directed by provider Daily.  Dispense: 14 patch; Refill: 0        Plan of care reviewed with patient at the conclusion of today's visit. Education was provided regarding diagnosis and management.  Patient verbalizes understanding of and agreement with management plan.      Follow Up:   Return in about 3 months (around 2/15/2022) for Recheck.          DO AIRAM Guerrero  MARLON  [facility]  [address]  [facility] [address]  Fax [phone]  Phone [phone]

## 2021-11-15 NOTE — ASSESSMENT & PLAN NOTE
Suspect associated with current congestion and upper respiratory symptoms. Will work towards optimizing allergic rhinitis. If LAD persists will proceed with imaging.

## 2021-11-16 LAB — HIV1+2 AB SER QL: NORMAL

## 2021-11-18 LAB
C TRACH RRNA SPEC QL NAA+PROBE: NEGATIVE
N GONORRHOEA RRNA SPEC QL NAA+PROBE: POSITIVE
T VAGINALIS DNA SPEC QL NAA+PROBE: NEGATIVE

## 2021-11-19 ENCOUNTER — TELEPHONE (OUTPATIENT)
Dept: FAMILY MEDICINE CLINIC | Facility: CLINIC | Age: 41
End: 2021-11-19

## 2021-11-19 ENCOUNTER — OFFICE VISIT (OUTPATIENT)
Dept: FAMILY MEDICINE CLINIC | Facility: CLINIC | Age: 41
End: 2021-11-19

## 2021-11-19 VITALS
DIASTOLIC BLOOD PRESSURE: 80 MMHG | HEIGHT: 67 IN | WEIGHT: 213 LBS | SYSTOLIC BLOOD PRESSURE: 122 MMHG | HEART RATE: 101 BPM | BODY MASS INDEX: 33.43 KG/M2 | OXYGEN SATURATION: 98 %

## 2021-11-19 DIAGNOSIS — A54.9 GONORRHEA: Primary | ICD-10-CM

## 2021-11-19 PROCEDURE — 99213 OFFICE O/P EST LOW 20 MIN: CPT | Performed by: STUDENT IN AN ORGANIZED HEALTH CARE EDUCATION/TRAINING PROGRAM

## 2021-11-19 PROCEDURE — 96372 THER/PROPH/DIAG INJ SC/IM: CPT | Performed by: STUDENT IN AN ORGANIZED HEALTH CARE EDUCATION/TRAINING PROGRAM

## 2021-11-19 RX ORDER — CEFTRIAXONE 500 MG/1
500 INJECTION, POWDER, FOR SOLUTION INTRAMUSCULAR; INTRAVENOUS ONCE
Status: COMPLETED | OUTPATIENT
Start: 2021-11-19 | End: 2021-11-19

## 2021-11-19 RX ADMIN — CEFTRIAXONE 500 MG: 500 INJECTION, POWDER, FOR SOLUTION INTRAMUSCULAR; INTRAVENOUS at 13:35

## 2021-11-19 NOTE — TELEPHONE ENCOUNTER
Attempted to contact, no answer. Reached out to patient's emergency contact. Asked for patient to return our call     Please call the patient regarding her abnormal result. Gonorrhea is positive. Can you see if patient can come in today for a same-day appointment with me so we can given treatment and discuss this? Thanks

## 2021-11-19 NOTE — TELEPHONE ENCOUNTER
----- Message from Unique Aguilar DO sent at 11/19/2021  8:29 AM EST -----  Please call the patient regarding her abnormal result. Gonorrhea is positive. Can you see if patient can come in today for a same-day appointment with me so we can given treatment and discuss this? Thanks.

## 2021-11-19 NOTE — PROGRESS NOTES
Established Office Visit      Patient Name: Vania Garcia  : 1980   MRN: [id number]   Care Team: Patient Care Team:  Olinda Lowe MD as PCP - General (Internal Medicine)  Provider, No Known as PCP - Family Medicine  Igor Colón MD as Obstetrician (Obstetrics and Gynecology)    Chief Complaint:    Chief Complaint   Patient presents with   • Exposure to STD     gonorrhea f/u       History of Present Illness: Vania Garcia is a 41 y.o. female who is here today to follow up with positive STD.    Patient was seen 3 day ago and requested STD screening after new partner started experiencing abnormal penile symptoms. Gonorrhea resulted positive. She returns today for treatment. She has had 2 partners in the past several months. One partner has already been treated for this current infection and the other partner has been notified. She does not have any symptoms currently. Feeling well.     Subjective      Review of Systems:   Review of Systems - See HPI    I have reviewed and the following portions of the patient's history were updated as appropriate: past family history, past medical history, past social history, past surgical history and problem list.    Medications:     Current Outpatient Medications:   •  benzoyl peroxide (benzoyl peroxide) 5 % external liquid, Apply  topically to the appropriate area as directed 2 (Two) Times a Day., Disp: 142 g, Rfl: 12  •  fluticasone (Flonase) 50 MCG/ACT nasal spray, 1 spray into the nostril(s) as directed by provider Daily., Disp: 9.9 mL, Rfl: 11  •  loratadine (Claritin) 10 MG tablet, Take 1 tablet by mouth Daily., Disp: 30 tablet, Rfl: 2  •  [START ON 2021] nicotine (Nicoderm CQ) 14 MG/24HR patch, Place 1 patch on the skin as directed by provider Daily., Disp: 14 patch, Rfl: 0  •  nicotine (Nicoderm CQ) 21 MG/24HR patch, Place 1 patch on the skin as directed by provider Daily., Disp: 14 patch, Rfl: 0  •  [START ON 2021]  "nicotine (Nicoderm CQ) 7 MG/24HR patch, Place 1 patch on the skin as directed by provider Daily., Disp: 14 patch, Rfl: 0    Current Facility-Administered Medications:   •  cefTRIAXone (ROCEPHIN) injection 500 mg, 500 mg, Intramuscular, Once, Unique Aguilar DO    Allergies:   No Known Allergies    Objective     Physical Exam:  Vital Signs:   Vitals:    11/19/21 1301   BP: 122/80   Pulse: 101   SpO2: 98%   Weight: 96.6 kg (213 lb)   Height: 170.2 cm (67\")     Body mass index is 33.36 kg/m².     Physical Exam  Vitals reviewed.   Constitutional:       Appearance: Normal appearance.   Cardiovascular:      Rate and Rhythm: Normal rate.      Pulses: Normal pulses.   Pulmonary:      Effort: Pulmonary effort is normal. No respiratory distress.   Skin:     General: Skin is warm and dry.   Neurological:      Mental Status: She is alert.   Psychiatric:         Mood and Affect: Mood normal.         Behavior: Behavior normal.         Judgment: Judgment normal.         Assessment / Plan      Assessment/Plan:   Problems Addressed This Visit  Diagnoses and all orders for this visit:    1. Gonorrhea (Primary)  -     cefTRIAXone (ROCEPHIN) injection 500 mg    Asymptomatic. Will treat with Rocephin 500mg single dose today. Counseled on importance of safe sexual habits. Counseled on importance of strict condom use to prevent STD transmission in the future. Avoid sexual activity x1 week. Discussed importance of notifying all recent sexual partners over the past few months (she has had two and both have been notified, one has received treatment already). She expressed no further questions and agreeable with plan. Recommended routine STD screening in the future with new sexual partners.       Plan of care reviewed with patient at the conclusion of today's visit. Education was provided regarding diagnosis and management.  Patient verbalizes understanding of and agreement with management plan.    Follow Up:   No follow-ups on " file.        DO AIRAM Guerrero RD  [facility]  [address]  [facility] [phone]  Fax [phone]  Phone [phone]

## 2021-12-10 DIAGNOSIS — R05.3 CHRONIC COUGH: Primary | ICD-10-CM

## 2021-12-14 ENCOUNTER — APPOINTMENT (OUTPATIENT)
Dept: PULMONOLOGY | Facility: HOSPITAL | Age: 41
End: 2021-12-14

## 2021-12-20 ENCOUNTER — HOSPITAL ENCOUNTER (OUTPATIENT)
Dept: PULMONOLOGY | Facility: HOSPITAL | Age: 41
Discharge: HOME | End: 2021-12-20
Admitting: STUDENT IN AN ORGANIZED HEALTH CARE EDUCATION/TRAINING PROGRAM

## 2021-12-20 DIAGNOSIS — R05.3 CHRONIC COUGH: ICD-10-CM

## 2021-12-20 PROCEDURE — 94010 BREATHING CAPACITY TEST: CPT | Performed by: INTERNAL MEDICINE

## 2021-12-20 PROCEDURE — 94010 BREATHING CAPACITY TEST: CPT

## 2022-02-14 ENCOUNTER — OFFICE VISIT (OUTPATIENT)
Dept: FAMILY MEDICINE CLINIC | Facility: CLINIC | Age: 42
End: 2022-02-14

## 2022-02-14 VITALS
HEART RATE: 85 BPM | HEIGHT: 67 IN | DIASTOLIC BLOOD PRESSURE: 90 MMHG | BODY MASS INDEX: 34.28 KG/M2 | WEIGHT: 218.4 LBS | SYSTOLIC BLOOD PRESSURE: 126 MMHG | OXYGEN SATURATION: 97 %

## 2022-02-14 DIAGNOSIS — Z72.0 TOBACCO USE: ICD-10-CM

## 2022-02-14 DIAGNOSIS — Z78.9 ALCOHOL USE: ICD-10-CM

## 2022-02-14 DIAGNOSIS — L21.9 SEBORRHEIC DERMATITIS: ICD-10-CM

## 2022-02-14 DIAGNOSIS — L40.9 PSORIASIS: ICD-10-CM

## 2022-02-14 DIAGNOSIS — R05.3 CHRONIC COUGH: Primary | ICD-10-CM

## 2022-02-14 PROCEDURE — 99214 OFFICE O/P EST MOD 30 MIN: CPT | Performed by: STUDENT IN AN ORGANIZED HEALTH CARE EDUCATION/TRAINING PROGRAM

## 2022-02-14 RX ORDER — OMEPRAZOLE 40 MG/1
40 CAPSULE, DELAYED RELEASE ORAL DAILY
Qty: 90 CAPSULE | Refills: 0 | Status: SHIPPED | OUTPATIENT
Start: 2022-02-14

## 2022-02-14 RX ORDER — CYCLOBENZAPRINE HCL 10 MG
10 TABLET ORAL 3 TIMES DAILY PRN
Qty: 30 TABLET | Refills: 0 | Status: CANCELLED | OUTPATIENT
Start: 2022-02-14

## 2022-02-14 RX ORDER — PREDNISONE 20 MG/1
20 TABLET ORAL DAILY
Qty: 5 TABLET | Refills: 0 | Status: CANCELLED | OUTPATIENT
Start: 2022-02-14

## 2022-02-14 NOTE — PROGRESS NOTES
Established Office Visit      Patient Name: Vania Garcia  : 1980   MRN: [id number]   Care Team: Patient Care Team:  Unique Aguilar DO as PCP - General (Internal Medicine)  Igor Colón MD as Obstetrician (Obstetrics and Gynecology)    Chief Complaint:    Chief Complaint   Patient presents with   • Exposure to STD     f/u        History of Present Illness: Vania Garcia is a 41 y.o. female who is here today to follow up with     Recovering from covid about 1 month ago -admits to persistent nagging cough with minimal mucus production.  Unsure if this is different from chronic cough we have discussed in the past.  She does feel some symptoms have improved with Flonase and Claritin.  Her PFTs were normal.  We discussed trial of PPI because she admits to history of acid reflux symptoms.    Alcohol use - has cut back on drinking habits, only drinking <1 pint liquor once per week where she was previously drinking a pint every other day.    Tobacco use - has used patches intermittently and feels they are helpful.  Still interested in quitting smoking completely.    History of psioriasis, seborrheic dermatitis -interested in following with dermatology.  She has new itchy, red patch that has developed on her right shoulder.  History of extensive plaques on bilateral elbows (not present today).  She has significant amount of dandruff as well.  Uses Selsun Blue and head and shoulder shampoo, this somewhat helps but would like to explore other options to.    Subjective      Review of Systems:   Review of Systems - See HPI    I have reviewed and the following portions of the patient's history were updated as appropriate: past family history, past medical history, past social history, past surgical history and problem list.    Medications:     Current Outpatient Medications:   •  benzoyl peroxide (benzoyl peroxide) 5 % external liquid, Apply  topically to the appropriate area as directed 2  "(Two) Times a Day., Disp: 142 g, Rfl: 12  •  fluticasone (Flonase) 50 MCG/ACT nasal spray, 1 spray into the nostril(s) as directed by provider Daily., Disp: 9.9 mL, Rfl: 11  •  loratadine (Claritin) 10 MG tablet, Take 1 tablet by mouth Daily., Disp: 30 tablet, Rfl: 2  •  nicotine (Nicoderm CQ) 14 MG/24HR patch, Place 1 patch on the skin as directed by provider Daily., Disp: 14 patch, Rfl: 0  •  nicotine (Nicoderm CQ) 21 MG/24HR patch, Place 1 patch on the skin as directed by provider Daily., Disp: 14 patch, Rfl: 0  •  nicotine (Nicoderm CQ) 7 MG/24HR patch, Place 1 patch on the skin as directed by provider Daily., Disp: 14 patch, Rfl: 0  •  omeprazole (priLOSEC) 40 MG capsule, Take 1 capsule by mouth Daily., Disp: 90 capsule, Rfl: 0    Allergies:   No Known Allergies    Objective     Physical Exam:  Vital Signs:   Vitals:    02/14/22 1536   BP: 126/90   Pulse: 85   SpO2: 97%   Weight: 99.1 kg (218 lb 6.4 oz)   Height: 170.2 cm (67\")     Body mass index is 34.21 kg/m².     Physical Exam  Vitals reviewed.   Constitutional:       Appearance: Normal appearance. She is obese.   HENT:      Head:      Comments: Dry flaky scalp  Cardiovascular:      Rate and Rhythm: Normal rate.      Pulses: Normal pulses.   Pulmonary:      Effort: Pulmonary effort is normal. No respiratory distress.   Skin:     General: Skin is warm and dry.      Comments: 2in erythematous raised scaly plaque noted on right shoulder.   Neurological:      Mental Status: She is alert.   Psychiatric:         Mood and Affect: Mood normal.         Behavior: Behavior normal.         Judgment: Judgment normal.         Assessment / Plan      Assessment/Plan:   Problems Addressed This Visit  Diagnoses and all orders for this visit:    1. Chronic cough (Primary)  -     omeprazole (priLOSEC) 40 MG capsule; Take 1 capsule by mouth Daily.  Dispense: 90 capsule; Refill: 0    Discussed multiple etiologies.  We went over her PFTs today, which were normal.  Advised her to " continue Flonase and Claritin as this has proven to be somewhat beneficial.  Discussed possibility of GERD presenting with chronic cough.  We will try PPI trial and reassess.    2. Tobacco use  Encouraged complete cessation.  Congratulated her on recently cutting back.  She will continue to use patches with goal of ultimately stopping within the next 3 months.  3. Alcohol use  Congratulated on recently cutting back.  She is aware of the dangers in abrupt cessation.  She will continue to gradually cut back.  4. Psoriasis  -     Ambulatory Referral to Dermatology  Recommended trial of over-the-counter hydrocortisone cream for mild lesions.  She will let me know if she needs prescription strength topical steroid.    5. Seborrheic dermatitis  Continue Selsun Blue        Plan of care reviewed with patient at the conclusion of today's visit. Education was provided regarding diagnosis and management.  Patient verbalizes understanding of and agreement with management plan.    Follow Up:   Return in about 3 months (around 5/14/2022) for Annual.        DO AIRAM Guerrero RD  [facility]  [address]  [facility] [address]  Fax [phone]  Phone [phone]

## 2022-03-10 ENCOUNTER — OFFICE VISIT (OUTPATIENT)
Dept: FAMILY MEDICINE CLINIC | Facility: CLINIC | Age: 42
End: 2022-03-10

## 2022-03-10 VITALS
BODY MASS INDEX: 32.3 KG/M2 | OXYGEN SATURATION: 99 % | HEIGHT: 67 IN | WEIGHT: 205.8 LBS | DIASTOLIC BLOOD PRESSURE: 76 MMHG | HEART RATE: 94 BPM | SYSTOLIC BLOOD PRESSURE: 112 MMHG

## 2022-03-10 DIAGNOSIS — N76.0 ACUTE VAGINITIS: Primary | ICD-10-CM

## 2022-03-10 DIAGNOSIS — Z72.0 TOBACCO USE: ICD-10-CM

## 2022-03-10 DIAGNOSIS — E66.9 OBESITY (BMI 30.0-34.9): ICD-10-CM

## 2022-03-10 DIAGNOSIS — N76.0 ACUTE VAGINITIS: ICD-10-CM

## 2022-03-10 PROBLEM — R59.0 LAD (LYMPHADENOPATHY) OF LEFT CERVICAL REGION: Status: RESOLVED | Noted: 2021-11-15 | Resolved: 2022-03-10

## 2022-03-10 PROCEDURE — 96372 THER/PROPH/DIAG INJ SC/IM: CPT | Performed by: STUDENT IN AN ORGANIZED HEALTH CARE EDUCATION/TRAINING PROGRAM

## 2022-03-10 PROCEDURE — 87798 DETECT AGENT NOS DNA AMP: CPT | Performed by: STUDENT IN AN ORGANIZED HEALTH CARE EDUCATION/TRAINING PROGRAM

## 2022-03-10 PROCEDURE — 87661 TRICHOMONAS VAGINALIS AMPLIF: CPT | Performed by: STUDENT IN AN ORGANIZED HEALTH CARE EDUCATION/TRAINING PROGRAM

## 2022-03-10 PROCEDURE — 87491 CHLMYD TRACH DNA AMP PROBE: CPT | Performed by: STUDENT IN AN ORGANIZED HEALTH CARE EDUCATION/TRAINING PROGRAM

## 2022-03-10 PROCEDURE — 87801 DETECT AGNT MULT DNA AMPLI: CPT | Performed by: STUDENT IN AN ORGANIZED HEALTH CARE EDUCATION/TRAINING PROGRAM

## 2022-03-10 PROCEDURE — 99214 OFFICE O/P EST MOD 30 MIN: CPT | Performed by: STUDENT IN AN ORGANIZED HEALTH CARE EDUCATION/TRAINING PROGRAM

## 2022-03-10 PROCEDURE — 87591 N.GONORRHOEAE DNA AMP PROB: CPT | Performed by: STUDENT IN AN ORGANIZED HEALTH CARE EDUCATION/TRAINING PROGRAM

## 2022-03-10 RX ORDER — CEFTRIAXONE 1 G/1
1 INJECTION, POWDER, FOR SOLUTION INTRAMUSCULAR; INTRAVENOUS EVERY 24 HOURS
Status: COMPLETED | OUTPATIENT
Start: 2022-03-10 | End: 2022-03-10

## 2022-03-10 RX ORDER — DOXYCYCLINE HYCLATE 100 MG/1
100 CAPSULE ORAL 2 TIMES DAILY
Qty: 14 CAPSULE | Refills: 0 | Status: SHIPPED | OUTPATIENT
Start: 2022-03-10 | End: 2022-06-13

## 2022-03-10 RX ADMIN — CEFTRIAXONE 1 G: 1 INJECTION, POWDER, FOR SOLUTION INTRAMUSCULAR; INTRAVENOUS at 10:10

## 2022-03-10 NOTE — PROGRESS NOTES
Established Office Visit      Patient Name: Vania Garcia  : 1980   MRN: [id number]   Care Team: Patient Care Team:  Unique Aguilar DO as PCP - General (Internal Medicine)  Igor Colón MD as Obstetrician (Obstetrics and Gynecology)    Chief Complaint:    Chief Complaint   Patient presents with   • Vaginal Itching   • Vaginal Discharge   • Abdominal Cramping     Sti testing       History of Present Illness: Vania Garcia is a 41 y.o. female who is here today to discuss vaginal symptoms.    Reports discharge and mild vaginal irritation ongoing for a few days. She is sexauly active with partner who she recently found out has multiple partners. She and her partner have history of treated gonorrhea. She denies fever, chills, nausea, vomiting, dysuria, abnormal uterine bleeding. Admits to some mild lower abdominal cramping.     Tobacco use- still smoking cigarettes, not interested in quitting today    Cervical LAD - resolved     Obesity - has lost 8lb in the past 4 months       Subjective      Review of Systems:   Review of Systems - See HPI    I have reviewed and the following portions of the patient's history were updated as appropriate: past family history, past medical history, past social history, past surgical history and problem list.    Medications:     Current Outpatient Medications:   •  benzoyl peroxide (benzoyl peroxide) 5 % external liquid, Apply  topically to the appropriate area as directed 2 (Two) Times a Day., Disp: 142 g, Rfl: 12  •  fluticasone (Flonase) 50 MCG/ACT nasal spray, 1 spray into the nostril(s) as directed by provider Daily., Disp: 9.9 mL, Rfl: 11  •  loratadine (Claritin) 10 MG tablet, Take 1 tablet by mouth Daily., Disp: 30 tablet, Rfl: 2  •  omeprazole (priLOSEC) 40 MG capsule, Take 1 capsule by mouth Daily., Disp: 90 capsule, Rfl: 0  •  doxycycline (VIBRAMYCIN) 100 MG capsule, Take 1 capsule by mouth 2 (Two) Times a Day., Disp: 14 capsule, Rfl: 0  No  "current facility-administered medications for this visit.    Allergies:   No Known Allergies    Objective     Physical Exam:  Vital Signs:   Vitals:    03/10/22 0909   BP: 112/76   Pulse: 94   SpO2: 99%   Weight: 93.4 kg (205 lb 12.8 oz)   Height: 170.2 cm (67\")     Body mass index is 32.23 kg/m².     Physical Exam  Vitals reviewed.   Constitutional:       General: She is not in acute distress.     Appearance: Normal appearance. She is not ill-appearing.   Cardiovascular:      Rate and Rhythm: Normal rate and regular rhythm.      Pulses: Normal pulses.      Heart sounds: Normal heart sounds.   Pulmonary:      Effort: Pulmonary effort is normal.      Breath sounds: Normal breath sounds.   Abdominal:      General: Abdomen is flat. There is no distension.      Palpations: Abdomen is soft.      Tenderness: There is no abdominal tenderness. There is no guarding or rebound.   Lymphadenopathy:      Cervical: No cervical adenopathy.         Assessment / Plan      Assessment/Plan:   Problems Addressed This Visit  Diagnoses and all orders for this visit:    1. Acute vaginitis (Primary)  -     Chlamydia trachomatis, Neisseria gonorrhoeae, Trichomonas vaginalis, PCR - Swab, Urine, Clean Catch; Future  -     NuSwab VG, Candida 6sp - Swab, Vagina; Future  -     Chlamydia trachomatis, Neisseria gonorrhoeae, Trichomonas vaginalis, PCR - Swab, Urine, Clean Catch  -     cefTRIAXone (ROCEPHIN) injection 1 g  -     doxycycline (VIBRAMYCIN) 100 MG capsule; Take 1 capsule by mouth 2 (Two) Times a Day.  Dispense: 14 capsule; Refill: 0    STD and vaginitis swab today. Given her and her partner's history of gonorrhea, she elects empiric treatment while awaiting results. IM rocephin injection and rx for doxycycline given today for coverage of G/C. Discussed importance of safe sex habits and barrier contraception to prevent STD transmission.     2. Obesity (BMI 30.0-34.9)  Has lost 8lb in the past 4 months    3. Tobacco use  Encouraged " cessation but she is not ready to quit today    4. Cervical LAD - resolved, thought to be due to covid infection         Plan of care reviewed with patient at the conclusion of today's visit. Education was provided regarding diagnosis and management.  Patient verbalizes understanding of and agreement with management plan.    Follow Up:   Return in about 3 months (around 6/10/2022) for Annual . last pap was about 2 years ago with Dr. Lowe per patient, will request records.         DO AIRAM Guerrero RD  [facility]  [address]  [facility] [phone]  Fax [phone]  Phone [phone]

## 2022-03-14 ENCOUNTER — PATIENT MESSAGE (OUTPATIENT)
Dept: FAMILY MEDICINE CLINIC | Facility: CLINIC | Age: 42
End: 2022-03-14

## 2022-03-14 DIAGNOSIS — N76.0 ACUTE VAGINITIS: Primary | ICD-10-CM

## 2022-03-14 LAB
A VAGINAE DNA VAG QL NAA+PROBE: ABNORMAL SCORE
BVAB2 DNA VAG QL NAA+PROBE: ABNORMAL SCORE
C ALBICANS DNA VAG QL NAA+PROBE: NEGATIVE
C GLABRATA DNA VAG QL NAA+PROBE: NEGATIVE
C KRUSEI DNA VAG QL NAA+PROBE: NEGATIVE
C LUSITANIAE DNA VAG QL NAA+PROBE: NEGATIVE
C TRACH DNA SPEC QL NAA+PROBE: NEGATIVE
CANDIDA DNA VAG QL NAA+PROBE: NEGATIVE
MEGA1 DNA VAG QL NAA+PROBE: ABNORMAL SCORE
N GONORRHOEA DNA SPEC QL NAA+PROBE: NEGATIVE
T VAGINALIS DNA SPEC QL NAA+PROBE: NEGATIVE
T VAGINALIS DNA VAG QL NAA+PROBE: NEGATIVE

## 2022-03-14 RX ORDER — METRONIDAZOLE 500 MG/1
500 TABLET ORAL 2 TIMES DAILY
Qty: 14 TABLET | Refills: 0 | Status: SHIPPED | OUTPATIENT
Start: 2022-03-14 | End: 2022-04-14

## 2022-04-11 DIAGNOSIS — N76.0 ACUTE VAGINITIS: Primary | ICD-10-CM

## 2022-04-11 DIAGNOSIS — N76.0 ACUTE VAGINITIS: ICD-10-CM

## 2022-04-11 PROCEDURE — 87491 CHLMYD TRACH DNA AMP PROBE: CPT | Performed by: STUDENT IN AN ORGANIZED HEALTH CARE EDUCATION/TRAINING PROGRAM

## 2022-04-11 PROCEDURE — 87591 N.GONORRHOEAE DNA AMP PROB: CPT | Performed by: STUDENT IN AN ORGANIZED HEALTH CARE EDUCATION/TRAINING PROGRAM

## 2022-04-11 PROCEDURE — 87798 DETECT AGENT NOS DNA AMP: CPT | Performed by: STUDENT IN AN ORGANIZED HEALTH CARE EDUCATION/TRAINING PROGRAM

## 2022-04-11 PROCEDURE — 87661 TRICHOMONAS VAGINALIS AMPLIF: CPT | Performed by: STUDENT IN AN ORGANIZED HEALTH CARE EDUCATION/TRAINING PROGRAM

## 2022-04-11 PROCEDURE — 87801 DETECT AGNT MULT DNA AMPLI: CPT | Performed by: STUDENT IN AN ORGANIZED HEALTH CARE EDUCATION/TRAINING PROGRAM

## 2022-04-14 LAB
A VAGINAE DNA VAG QL NAA+PROBE: ABNORMAL SCORE
BVAB2 DNA VAG QL NAA+PROBE: ABNORMAL SCORE
C ALBICANS DNA VAG QL NAA+PROBE: NEGATIVE
C GLABRATA DNA VAG QL NAA+PROBE: NEGATIVE
C TRACH DNA VAG QL NAA+PROBE: NEGATIVE
MEGA1 DNA VAG QL NAA+PROBE: ABNORMAL SCORE
N GONORRHOEA DNA VAG QL NAA+PROBE: NEGATIVE
T VAGINALIS DNA VAG QL NAA+PROBE: NEGATIVE

## 2022-04-14 RX ORDER — TINIDAZOLE 500 MG/1
1 TABLET ORAL
Qty: 10 TABLET | Refills: 0 | Status: SHIPPED | OUTPATIENT
Start: 2022-04-14 | End: 2022-04-19

## 2022-06-13 ENCOUNTER — LAB (OUTPATIENT)
Dept: LAB | Facility: HOSPITAL | Age: 42
End: 2022-06-13

## 2022-06-13 ENCOUNTER — OFFICE VISIT (OUTPATIENT)
Dept: FAMILY MEDICINE CLINIC | Facility: CLINIC | Age: 42
End: 2022-06-13

## 2022-06-13 VITALS
WEIGHT: 210 LBS | SYSTOLIC BLOOD PRESSURE: 118 MMHG | HEART RATE: 77 BPM | BODY MASS INDEX: 32.96 KG/M2 | TEMPERATURE: 97.7 F | OXYGEN SATURATION: 96 % | HEIGHT: 67 IN | DIASTOLIC BLOOD PRESSURE: 68 MMHG

## 2022-06-13 DIAGNOSIS — J30.2 SEASONAL ALLERGIC RHINITIS, UNSPECIFIED TRIGGER: ICD-10-CM

## 2022-06-13 DIAGNOSIS — R06.2 WHEEZING: ICD-10-CM

## 2022-06-13 DIAGNOSIS — Z00.00 ANNUAL PHYSICAL EXAM: ICD-10-CM

## 2022-06-13 DIAGNOSIS — E66.9 OBESITY (BMI 30.0-34.9): ICD-10-CM

## 2022-06-13 DIAGNOSIS — Z11.59 ENCOUNTER FOR HEPATITIS C SCREENING TEST FOR LOW RISK PATIENT: ICD-10-CM

## 2022-06-13 DIAGNOSIS — Z11.3 SCREENING EXAMINATION FOR STD (SEXUALLY TRANSMITTED DISEASE): ICD-10-CM

## 2022-06-13 DIAGNOSIS — Z72.0 TOBACCO USE: ICD-10-CM

## 2022-06-13 DIAGNOSIS — Z00.00 ANNUAL PHYSICAL EXAM: Primary | ICD-10-CM

## 2022-06-13 LAB
ALBUMIN SERPL-MCNC: 4.4 G/DL (ref 3.5–5.2)
ALBUMIN/GLOB SERPL: 1.4 G/DL
ALP SERPL-CCNC: 52 U/L (ref 39–117)
ALT SERPL W P-5'-P-CCNC: 16 U/L (ref 1–33)
ANION GAP SERPL CALCULATED.3IONS-SCNC: 10.7 MMOL/L (ref 5–15)
AST SERPL-CCNC: 14 U/L (ref 1–32)
BILIRUB SERPL-MCNC: 0.3 MG/DL (ref 0–1.2)
BUN SERPL-MCNC: 8 MG/DL (ref 6–20)
BUN/CREAT SERPL: 10.1 (ref 7–25)
CALCIUM SPEC-SCNC: 9.1 MG/DL (ref 8.6–10.5)
CHLORIDE SERPL-SCNC: 105 MMOL/L (ref 98–107)
CHOLEST SERPL-MCNC: 223 MG/DL (ref 0–200)
CO2 SERPL-SCNC: 21.3 MMOL/L (ref 22–29)
CREAT SERPL-MCNC: 0.79 MG/DL (ref 0.57–1)
DEPRECATED RDW RBC AUTO: 43.4 FL (ref 37–54)
EGFRCR SERPLBLD CKD-EPI 2021: 95.9 ML/MIN/1.73
ERYTHROCYTE [DISTWIDTH] IN BLOOD BY AUTOMATED COUNT: 12.3 % (ref 12.3–15.4)
GLOBULIN UR ELPH-MCNC: 3.2 GM/DL
GLUCOSE SERPL-MCNC: 92 MG/DL (ref 65–99)
HBA1C MFR BLD: 5.1 % (ref 4.8–5.6)
HCT VFR BLD AUTO: 40.6 % (ref 34–46.6)
HDLC SERPL-MCNC: 49 MG/DL (ref 40–60)
HGB BLD-MCNC: 13.8 G/DL (ref 12–15.9)
LDLC SERPL CALC-MCNC: 146 MG/DL (ref 0–100)
LDLC/HDLC SERPL: 2.93 {RATIO}
MCH RBC QN AUTO: 33.3 PG (ref 26.6–33)
MCHC RBC AUTO-ENTMCNC: 34 G/DL (ref 31.5–35.7)
MCV RBC AUTO: 97.8 FL (ref 79–97)
PLATELET # BLD AUTO: 294 10*3/MM3 (ref 140–450)
PMV BLD AUTO: 12.8 FL (ref 6–12)
POTASSIUM SERPL-SCNC: 4.1 MMOL/L (ref 3.5–5.2)
PROT SERPL-MCNC: 7.6 G/DL (ref 6–8.5)
RBC # BLD AUTO: 4.15 10*6/MM3 (ref 3.77–5.28)
SODIUM SERPL-SCNC: 137 MMOL/L (ref 136–145)
TRIGL SERPL-MCNC: 153 MG/DL (ref 0–150)
TSH SERPL DL<=0.05 MIU/L-ACNC: 1.36 UIU/ML (ref 0.27–4.2)
VLDLC SERPL-MCNC: 28 MG/DL (ref 5–40)
WBC NRBC COR # BLD: 7.07 10*3/MM3 (ref 3.4–10.8)

## 2022-06-13 PROCEDURE — 87798 DETECT AGENT NOS DNA AMP: CPT | Performed by: STUDENT IN AN ORGANIZED HEALTH CARE EDUCATION/TRAINING PROGRAM

## 2022-06-13 PROCEDURE — 80061 LIPID PANEL: CPT

## 2022-06-13 PROCEDURE — 80050 GENERAL HEALTH PANEL: CPT

## 2022-06-13 PROCEDURE — 87591 N.GONORRHOEAE DNA AMP PROB: CPT | Performed by: STUDENT IN AN ORGANIZED HEALTH CARE EDUCATION/TRAINING PROGRAM

## 2022-06-13 PROCEDURE — 87661 TRICHOMONAS VAGINALIS AMPLIF: CPT | Performed by: STUDENT IN AN ORGANIZED HEALTH CARE EDUCATION/TRAINING PROGRAM

## 2022-06-13 PROCEDURE — 86803 HEPATITIS C AB TEST: CPT

## 2022-06-13 PROCEDURE — 87491 CHLMYD TRACH DNA AMP PROBE: CPT | Performed by: STUDENT IN AN ORGANIZED HEALTH CARE EDUCATION/TRAINING PROGRAM

## 2022-06-13 PROCEDURE — 87801 DETECT AGNT MULT DNA AMPLI: CPT | Performed by: STUDENT IN AN ORGANIZED HEALTH CARE EDUCATION/TRAINING PROGRAM

## 2022-06-13 PROCEDURE — 99396 PREV VISIT EST AGE 40-64: CPT | Performed by: STUDENT IN AN ORGANIZED HEALTH CARE EDUCATION/TRAINING PROGRAM

## 2022-06-13 PROCEDURE — 83036 HEMOGLOBIN GLYCOSYLATED A1C: CPT

## 2022-06-13 RX ORDER — ALBUTEROL SULFATE 90 UG/1
2 AEROSOL, METERED RESPIRATORY (INHALATION) EVERY 4 HOURS PRN
Qty: 8 G | Refills: 11 | Status: SHIPPED | OUTPATIENT
Start: 2022-06-13

## 2022-06-13 RX ORDER — KETOCONAZOLE 20 MG/ML
SHAMPOO TOPICAL
COMMUNITY
Start: 2022-03-25

## 2022-06-13 RX ORDER — TRETINOIN 0.5 MG/G
CREAM TOPICAL
COMMUNITY
Start: 2022-03-25

## 2022-06-13 RX ORDER — BETAMETHASONE DIPROPIONATE 0.5 MG/G
CREAM TOPICAL
COMMUNITY
Start: 2022-03-25

## 2022-06-13 NOTE — PROGRESS NOTES
Physical Exam     Patient Name: Vania Garcia  : 1980   MRN: [id number]   Care Team: Patient Care Team:  Unique Aguilar DO as PCP - General (Internal Medicine)  Igor Colón MD as Obstetrician (Obstetrics and Gynecology)    Chief Complaint:    Chief Complaint   Patient presents with   • Annual Exam       History of Present Illness: Vania Garcia is a 42 y.o. female who is presents today for annual healthcare maintenance. Overall, patient's health is described as good.  at 21c. Has been cutting back on alcohol use lately, previously drinking every night and now only drinking once per week. She has been exercising regularly, enjoys the elliptical. Has cut back on junk food and eating out.     Still smoking 1ppd - has patches at home but not committed to smoking cessation at this time.     Health Maintenance   Topic Date Due   • HEPATITIS C SCREENING  Never done   • COVID-19 Vaccine (1) 06/15/2022 (Originally 3/18/1985)   • Pneumococcal Vaccine 0-64 (1 - PCV) 2023 (Originally 3/18/1986)   • PAP SMEAR  2023 (Originally 2017)   • INFLUENZA VACCINE  2022   • ANNUAL PHYSICAL  2023   • TDAP/TD VACCINES (2 - Td or Tdap) 2029       Subjective      Review of Systems:   Review of Systems - See HPI    Past Medical History: History reviewed. No pertinent past medical history.    Past Surgical History:   Past Surgical History:   Procedure Laterality Date   • ANKLE SURGERY Right    • KNEE ACL RECONSTRUCTION Right 10/31/2019    Procedure: KNEE ANTERIOR CRUCIATE LIGAMENT RECONSTRUCTION WITH AUTOGRAFT HAMSTRINGS RIGHT, ALLOGRAFT;  Surgeon: Quang Alex MD;  Location: [facility];  Service: Orthopedics   • WISDOM TOOTH EXTRACTION         Family History:   Family History   Problem Relation Age of Onset   • Breast cancer Neg Hx    • Ovarian cancer Neg Hx    • Colon cancer Neg Hx    • Diabetes Neg Hx        Social History:   Social History  "    Socioeconomic History   • Marital status: Single   Tobacco Use   • Smoking status: Current Every Day Smoker     Packs/day: 1.00     Years: 26.00     Pack years: 26.00     Types: Cigarettes   • Smokeless tobacco: Never Used   Vaping Use   • Vaping Use: Never used   Substance and Sexual Activity   • Alcohol use: Yes     Alcohol/week: 3.0 standard drinks     Types: 3 Shots of liquor per week   • Drug use: Yes     Types: Marijuana   • Sexual activity: Defer       Tobacco History:   Social History     Tobacco Use   Smoking Status Current Every Day Smoker   • Packs/day: 1.00   • Years: 26.00   • Pack years: 26.00   • Types: Cigarettes   Smokeless Tobacco Never Used       Medications:     Current Outpatient Medications:   •  benzoyl peroxide (benzoyl peroxide) 5 % external liquid, Apply  topically to the appropriate area as directed 2 (Two) Times a Day., Disp: 142 g, Rfl: 12  •  betamethasone dipropionate 0.05 % cream, , Disp: , Rfl:   •  fluticasone (Flonase) 50 MCG/ACT nasal spray, 1 spray into the nostril(s) as directed by provider Daily., Disp: 9.9 mL, Rfl: 11  •  ketoconazole (NIZORAL) 2 % shampoo, , Disp: , Rfl:   •  loratadine (Claritin) 10 MG tablet, Take 1 tablet by mouth Daily., Disp: 30 tablet, Rfl: 2  •  omeprazole (priLOSEC) 40 MG capsule, Take 1 capsule by mouth Daily., Disp: 90 capsule, Rfl: 0  •  Retin-A 0.05 % cream, , Disp: , Rfl:   •  albuterol sulfate  (90 Base) MCG/ACT inhaler, Inhale 2 puffs Every 4 (Four) Hours As Needed for Wheezing., Disp: 8 g, Rfl: 11    Allergies:   No Known Allergies    Past Medical History, Social History, Family History and Care Team were all reviewed with patient and updated as appropriate.       Objective     Physical Exam:  Vital Signs:   Vitals:    06/13/22 1414   BP: 118/68   Pulse: 77   Temp: 97.7 °F (36.5 °C)   SpO2: 96%   Weight: 95.3 kg (210 lb)   Height: 170.2 cm (67\")   PainSc:   4   PainLoc: Abdomen     Body mass index is 32.89 kg/m².     Physical " Exam  Vitals reviewed.   Constitutional:       Appearance: Normal appearance. She is not ill-appearing.   Cardiovascular:      Rate and Rhythm: Normal rate and regular rhythm.      Pulses: Normal pulses.      Heart sounds: Normal heart sounds.   Pulmonary:      Effort: Pulmonary effort is normal. No respiratory distress.      Comments: +faint expiratory wheezing in bilateral upper lobes  Skin:     General: Skin is warm and dry.   Neurological:      Mental Status: She is alert.   Psychiatric:         Mood and Affect: Mood normal.         Behavior: Behavior normal.         Judgment: Judgment normal.         Assessment / Plan      Assessment/Plan:   Problems Addressed This Visit  Diagnoses and all orders for this visit:    1. Annual physical exam (Primary)  -     CBC (No Diff); Future  -     Lipid Panel; Future  -     Hemoglobin A1c; Future  -     Comprehensive Metabolic Panel; Future  -     TSH; Future    Healthcare Maintenance   Vaccines:  -COVID19 due - encouraged this but pt not interested  -Influenza due in the fall  -PCV due - encouraged this but pt not interested   -Tdap 2019  -Shingrix due at 50    Cancer Screening  -Mammogram offered but pt prefers to hold off at this time, will consider starting screening next year. Denies family history of breast cancer.  -Colonoscopy discuss at age 45  -Pap smear due - pt declined today  -Lung cancer screening discuss at 50    Other  -PHQ score   -Counseled on importance of maintaining healthy diet and routine exercise. Encouraged 150 min exercise per week.  -Tobacco cessation: as detailed below  -Sexual Health: single, sexually active, STD screening today. Discussed safe sexual practice.   -Blood pressure is at goal <130/80  -Metabolic screening due today    Encouraged routine eye and dental exams.  2. Tobacco use  Encouraged smoking cessation and counseled on adverse health risks associated with continued smoking. Discussed options for assistance including NRT,  medications, and cessation therapy. Patient is agreeable to quitting smoking. Has NRT patches at home.    3. Obesity (BMI 30.0-34.9)  Congratulated on recent diet/exercise changes. Encouraged consistency for long term weight loss     4. Seasonal allergic rhinitis, unspecified trigger    5. Screening examination for STD (sexually transmitted disease)  -     NuSwab VG+ - Swab, Vagina; Future  -     NuSwab VG+ - Swab, Vagina    6. Encounter for hepatitis C screening test for low risk patient  -     HCV Antibody Rfx To Qnt PCR; Future    7. Wheezing  -     albuterol sulfate  (90 Base) MCG/ACT inhaler; Inhale 2 puffs Every 4 (Four) Hours As Needed for Wheezing.  Dispense: 8 g; Refill: 11    PFT 12/2021 without evidence of obstructive/restrictive disease      Plan of care reviewed with patient at the conclusion of today's visit. Education was provided regarding diagnosis and management.  Patient verbalizes understanding of and agreement with management plan.    Follow Up:   Return in about 6 months (around 12/13/2022) for Recheck.        DO AIRAM Guerrero RD  [facility]  [address]  [facility] [address]  Fax [phone]  Phone [phone]

## 2022-06-15 LAB
HCV AB S/CO SERPL IA: 0.1 S/CO RATIO (ref 0–0.9)
HCV AB SERPL QL IA: NORMAL

## 2022-06-16 DIAGNOSIS — N76.0 BACTERIAL VAGINOSIS: Primary | ICD-10-CM

## 2022-06-16 DIAGNOSIS — B96.89 BACTERIAL VAGINOSIS: Primary | ICD-10-CM

## 2022-06-16 RX ORDER — METRONIDAZOLE 500 MG/1
500 TABLET ORAL 2 TIMES DAILY
Qty: 14 TABLET | Refills: 0 | Status: SHIPPED | OUTPATIENT
Start: 2022-06-16 | End: 2022-07-22

## 2022-07-22 ENCOUNTER — OFFICE VISIT (OUTPATIENT)
Dept: FAMILY MEDICINE CLINIC | Facility: CLINIC | Age: 42
End: 2022-07-22

## 2022-07-22 VITALS
DIASTOLIC BLOOD PRESSURE: 86 MMHG | HEIGHT: 67 IN | OXYGEN SATURATION: 97 % | BODY MASS INDEX: 33.43 KG/M2 | WEIGHT: 213 LBS | HEART RATE: 92 BPM | SYSTOLIC BLOOD PRESSURE: 118 MMHG

## 2022-07-22 DIAGNOSIS — J30.2 SEASONAL ALLERGIC RHINITIS, UNSPECIFIED TRIGGER: ICD-10-CM

## 2022-07-22 DIAGNOSIS — Z51.81 MEDICATION MONITORING ENCOUNTER: Primary | ICD-10-CM

## 2022-07-22 DIAGNOSIS — Z30.42 ENCOUNTER FOR DEPO-PROVERA CONTRACEPTION: Primary | ICD-10-CM

## 2022-07-22 DIAGNOSIS — Z30.42 ENCOUNTER FOR DEPO-PROVERA CONTRACEPTION: ICD-10-CM

## 2022-07-22 PROCEDURE — 81025 URINE PREGNANCY TEST: CPT | Performed by: STUDENT IN AN ORGANIZED HEALTH CARE EDUCATION/TRAINING PROGRAM

## 2022-07-22 PROCEDURE — 99213 OFFICE O/P EST LOW 20 MIN: CPT | Performed by: STUDENT IN AN ORGANIZED HEALTH CARE EDUCATION/TRAINING PROGRAM

## 2022-07-22 RX ORDER — LORATADINE 10 MG/1
10 TABLET ORAL DAILY
Qty: 30 TABLET | Refills: 2 | Status: SHIPPED | OUTPATIENT
Start: 2022-07-22

## 2022-07-22 RX ORDER — FLUTICASONE PROPIONATE 50 MCG
1 SPRAY, SUSPENSION (ML) NASAL DAILY
Qty: 9.9 ML | Refills: 11 | Status: SHIPPED | OUTPATIENT
Start: 2022-07-22

## 2022-07-22 RX ORDER — MEDROXYPROGESTERONE ACETATE 150 MG/ML
150 INJECTION, SUSPENSION INTRAMUSCULAR
Qty: 1 ML | Refills: 3 | Status: SHIPPED | OUTPATIENT
Start: 2022-07-22

## 2022-07-22 NOTE — PROGRESS NOTES
Established Office Visit      Patient Name: Vania Garcia  : 1980   MRN: [id number]   Care Team: Patient Care Team:  Unique Aguilar DO as PCP - General (Internal Medicine)  Igor Colón MD as Obstetrician (Obstetrics and Gynecology)    Chief Complaint:    Chief Complaint   Patient presents with   • Contraception     Wants depo prescription        History of Present Illness: Vania Garcia is a 42 y.o. female who is here today to discuss birth control.     She is planning to start acutane next week with her dermatologist, follows with [facility]. She has been advised to have contraception before starting this. Plans to be on acutane for 6 months. She has been on depo provera in the past and is interested in restarting this. She denies medication side effects when taking this previously other than lack of menstrual cycle.     Subjective      Review of Systems:   Review of Systems - See HPI    I have reviewed and the following portions of the patient's history were updated as appropriate: past family history, past medical history, past social history, past surgical history and problem list.    Medications:     Current Outpatient Medications:   •  albuterol sulfate  (90 Base) MCG/ACT inhaler, Inhale 2 puffs Every 4 (Four) Hours As Needed for Wheezing., Disp: 8 g, Rfl: 11  •  benzoyl peroxide (benzoyl peroxide) 5 % external liquid, Apply  topically to the appropriate area as directed 2 (Two) Times a Day., Disp: 142 g, Rfl: 12  •  betamethasone dipropionate 0.05 % cream, , Disp: , Rfl:   •  fluticasone (Flonase) 50 MCG/ACT nasal spray, 1 spray into the nostril(s) as directed by provider Daily., Disp: 9.9 mL, Rfl: 11  •  ketoconazole (NIZORAL) 2 % shampoo, , Disp: , Rfl:   •  loratadine (Claritin) 10 MG tablet, Take 1 tablet by mouth Daily., Disp: 30 tablet, Rfl: 2  •  omeprazole (priLOSEC) 40 MG capsule, Take 1 capsule by mouth Daily., Disp: 90 capsule, Rfl: 0  •   "Retin-A 0.05 % cream, , Disp: , Rfl:   •  medroxyPROGESTERone (Depo-Provera) 150 MG/ML injection, Inject 1 mL into the appropriate muscle as directed by prescriber Every 3 (Three) Months., Disp: 1 mL, Rfl: 3    Allergies:   No Known Allergies    Objective     Physical Exam:  Vital Signs:   Vitals:    07/22/22 1342   BP: 118/86   Pulse: 92   SpO2: 97%   Weight: 96.6 kg (213 lb)   Height: 170.2 cm (67\")     Body mass index is 33.36 kg/m².     Physical Exam  Vitals reviewed.   Constitutional:       Appearance: Normal appearance.   Cardiovascular:      Rate and Rhythm: Normal rate.   Pulmonary:      Effort: Pulmonary effort is normal. No respiratory distress.   Skin:     General: Skin is warm and dry.   Neurological:      Mental Status: She is alert.   Psychiatric:         Mood and Affect: Mood normal.         Behavior: Behavior normal.         Judgment: Judgment normal.         Assessment / Plan      Assessment/Plan:   Problems Addressed This Visit  Diagnoses and all orders for this visit:    1. Encounter for Depo-Provera contraception (Primary)  -     medroxyPROGESTERone (Depo-Provera) 150 MG/ML injection; Inject 1 mL into the appropriate muscle as directed by prescriber Every 3 (Three) Months.  Dispense: 1 mL; Refill: 3    She requests depo injection for contraception while taking Acutane - she reports forgetfulness with taking pills and does not like the idea of IUD or implantable device. Discussed possible medication side effects associated with depo provera and increased risk of osteoporosis. She expressed understanding and has tolerated this well in the past. Plans to take this only for duration of Acutane therapy - predicted to be 6-12 months. Will send rx today and she will plan to return next week for injection. Will need urine pregnancy test prior to injection (ordered).       2. Seasonal allergic rhinitis, unspecified trigger  -     fluticasone (Flonase) 50 MCG/ACT nasal spray; 1 spray into the nostril(s) as " directed by provider Daily.  Dispense: 9.9 mL; Refill: 11  -     loratadine (Claritin) 10 MG tablet; Take 1 tablet by mouth Daily.  Dispense: 30 tablet; Refill: 2    Flonase and claritin refilled today       Plan of care reviewed with patient at the conclusion of today's visit. Education was provided regarding diagnosis and management.  Patient verbalizes understanding of and agreement with management plan.    Follow Up:   No follow-ups on file.        DO AIRAM Guerrero RD  [facility]  [address]  [facility] [phone]  Fax [phone]  Phone [phone]

## 2022-07-28 ENCOUNTER — CLINICAL SUPPORT (OUTPATIENT)
Dept: FAMILY MEDICINE CLINIC | Facility: CLINIC | Age: 42
End: 2022-07-28

## 2022-07-28 DIAGNOSIS — Z30.42 ENCOUNTER FOR DEPO-PROVERA CONTRACEPTION: Primary | ICD-10-CM

## 2022-07-28 LAB
B-HCG UR QL: NEGATIVE
EXPIRATION DATE: NORMAL
INTERNAL NEGATIVE CONTROL: NORMAL
INTERNAL POSITIVE CONTROL: NORMAL
Lab: NORMAL

## 2022-07-28 PROCEDURE — 96372 THER/PROPH/DIAG INJ SC/IM: CPT | Performed by: STUDENT IN AN ORGANIZED HEALTH CARE EDUCATION/TRAINING PROGRAM

## 2022-07-28 RX ORDER — MEDROXYPROGESTERONE ACETATE 150 MG/ML
150 INJECTION, SUSPENSION INTRAMUSCULAR ONCE
Status: COMPLETED | OUTPATIENT
Start: 2022-07-28 | End: 2022-07-28

## 2022-07-28 RX ADMIN — MEDROXYPROGESTERONE ACETATE 150 MG: 150 INJECTION, SUSPENSION INTRAMUSCULAR at 14:21

## 2022-08-11 ENCOUNTER — OFFICE VISIT (OUTPATIENT)
Dept: OBSTETRICS AND GYNECOLOGY | Facility: CLINIC | Age: 42
End: 2022-08-11

## 2022-08-11 VITALS
SYSTOLIC BLOOD PRESSURE: 110 MMHG | BODY MASS INDEX: 33.43 KG/M2 | WEIGHT: 213 LBS | DIASTOLIC BLOOD PRESSURE: 70 MMHG | HEIGHT: 67 IN

## 2022-08-11 DIAGNOSIS — Z01.419 WELL WOMAN EXAM WITH ROUTINE GYNECOLOGICAL EXAM: Primary | ICD-10-CM

## 2022-08-11 DIAGNOSIS — Z12.31 SCREENING MAMMOGRAM FOR BREAST CANCER: ICD-10-CM

## 2022-08-11 PROCEDURE — 2014F MENTAL STATUS ASSESS: CPT | Performed by: NURSE PRACTITIONER

## 2022-08-11 PROCEDURE — 3008F BODY MASS INDEX DOCD: CPT | Performed by: NURSE PRACTITIONER

## 2022-08-11 PROCEDURE — 99386 PREV VISIT NEW AGE 40-64: CPT | Performed by: NURSE PRACTITIONER

## 2022-08-11 NOTE — PROGRESS NOTES
Subjective   Chief Complaint   Patient presents with   • Annual Exam     Well woman gyn exam     Vania Garcia is a 42 y.o. year old  presenting to be seen for her annual exam. She has never had a mammogram in the past.   She has recently started back on depo as she started Accutane. She got her last injections 22. She had her first injection 22.   She also reports recurrent bv, she was last treated with flagyl a month ago. She has recently switched soaps.   SEXUAL Hx:  She is currently sexually active.  In the past year there there has been ONE new sexual partner.    Condoms are always used.  She would not like to be screened for STD's at today's exam.  Current birth control method: condoms and Depo-Provera.  She is happy with her current method of contraception and does not want to discuss alternative methods of contraception.  MENSTRUAL Hx:  Patient's last menstrual period was 2022 (exact date).  In the past 6 months her cycles have been absent.with depo regular prior to medication.  Her menstrual flow is typically normal.   Each month on average there are roughly 0 day(s) of very heavy flow.    Intermenstrual bleeding is absent.    Post-coital bleeding is absent.  Dysmenorrhea: mild and is not affecting her activities of daily living  PMS: mild and is not affecting her activities of daily living  Her cycles are not a source of concern for her that she wishes to discuss today.  HEALTH Hx:  She exercises regularly: yes.  She wears her seat belt: yes.  She has concerns about domestic violence: no.  OTHER THINGS SHE WANTS TO DISCUSS TODAY:  Nothing else    The following portions of the patient's history were reviewed and updated as appropriate:problem list, current medications, allergies, past family history, past medical history, past social history and past surgical history.    Social History    Tobacco Use      Smoking status: Current Every Day Smoker        Packs/day: 1.00         "Years: 26.00        Pack years: 26        Types: Cigarettes      Smokeless tobacco: Never Used    Review of Systems  Constitutional POS: nothing reported    NEG: anorexia or night sweats   Genitourinary POS: nothing reported    NEG: dysuria or hematuria      Gastointestinal POS: nothing reported    NEG: bloating, change in bowel habits, melena or reflux symptoms   Integument POS: nothing reported    NEG: moles that are changing in size, shape, color or rashes   Breast POS: nothing reported    NEG: persistent breast lump, skin dimpling or nipple discharge        Objective   /70 (BP Location: Left arm, Patient Position: Sitting, Cuff Size: Adult)   Ht 170.2 cm (67\")   Wt 96.6 kg (213 lb)   LMP 06/13/2022 (Exact Date)   Breastfeeding No   BMI 33.36 kg/m²     General:  well developed; well nourished  no acute distress  obese - Body mass index is 33.36 kg/m².   Skin:  No suspicious lesions seen   Thyroid: normal to inspection and palpation   Breasts:  Examined in supine position  Symmetric without masses or skin dimpling  Nipples normal without inversion, lesions or discharge  There are no palpable axillary nodes   Abdomen: soft, non-tender; no masses  no umbilical or inguinal hernias are present  no hepato-splenomegaly   Pelvis: Clinical staff was present for exam  :  urethral meatus normal;  Vaginal:  normal pink mucosa without prolapse or lesions.  Cervix:  normal appearance.  Uterus:  normal size, shape and consistency.  Adnexa:  normal bimanual exam of the adnexa.  Rectal:  digital rectal exam not performed; anus visually normal appearing.        Assessment   1. Well woman gynecological exam  2. Recurrent bv     Plan     1. Pap was not done today.  I explained to Vania that the recommendations for Pap smear interval in a low risk patient has lengthened to 3 years time.  I told Vania she still needs to be seen in our office yearly for a full physical including breast and pelvic exam.  2. She was " encouraged to get yearly mammograms.  She should report any palpable breast lump(s) or skin changes regardless of mammographic findings.  I explained to Vania that notification regarding her mammogram results will come from the center performing the study.  Our office will not be routinely calling with mammogram results.  It is her responsibility to make sure that the results from the mammogram are communicated to her by the breast center.  If she has any questions about the results, she is welcome to call our office anytime.  3. Discussed recurrent bv, one swab sent today. Discussed boric acid use for recurrent symptoms. Encourage plain water for vaginal cleansing.   4. The importance of keeping all planned follow-up and taking all medications as prescribed was emphasized.  5. Today I discussed with Vania the total recommended calcium intake for a premenopausal female is 1000 mg.  Ideally this should be from dietary sources.  I reviewed calcium content in various foods including milk, fortified orange juice and yogurt.  If she cannot get sufficient calcium through dietary means, it is recommended to supplement with either a multivitamin or calcium to reach her daily goal.  I also reviewed the difference in the bioavailability of calcium carbonate and calcium citrate containing supplements and the importance of taking calcium carbonate containing products with food.  Finally, vitamin D's role in calcium absorption was reviewed and a total daily vitamin D intake of 800 units was recommended. Discussed depo use calcium supplementation is even more important.   6. I discussed with Vania that she may be behind on needed vaccinations for COVID.  She may be able to obtain these vaccinations at her local pharmacy OR speak about obtaining them with her primary care.  If she does obtain her vaccines, I have asked Vania to let us know the date each vaccine was obtained so that her medical record could be updated in our  system.  7. Follow up for annual exam 1 year    No orders of the defined types were placed in this encounter.         This note was electronically signed.    Emeli Honeycutt, LEXIE  August 11, 2022

## 2022-08-16 RX ORDER — METRONIDAZOLE 500 MG/1
500 TABLET ORAL 2 TIMES DAILY
Qty: 14 TABLET | Refills: 0 | Status: SHIPPED | OUTPATIENT
Start: 2022-08-16 | End: 2022-08-23

## 2022-09-15 ENCOUNTER — TELEMEDICINE (OUTPATIENT)
Dept: FAMILY MEDICINE CLINIC | Facility: TELEHEALTH | Age: 42
End: 2022-09-15

## 2022-09-15 DIAGNOSIS — J22 LOWER RESPIRATORY INFECTION (E.G., BRONCHITIS, PNEUMONIA, PNEUMONITIS, PULMONITIS): Primary | ICD-10-CM

## 2022-09-15 PROCEDURE — 99213 OFFICE O/P EST LOW 20 MIN: CPT | Performed by: NURSE PRACTITIONER

## 2022-09-15 RX ORDER — BENZONATATE 200 MG/1
200 CAPSULE ORAL 3 TIMES DAILY PRN
Qty: 21 CAPSULE | Refills: 0 | Status: SHIPPED | OUTPATIENT
Start: 2022-09-15 | End: 2022-10-26

## 2022-09-15 RX ORDER — DOXYCYCLINE 100 MG/1
100 CAPSULE ORAL 2 TIMES DAILY
Qty: 20 CAPSULE | Refills: 0 | Status: SHIPPED | OUTPATIENT
Start: 2022-09-15 | End: 2022-09-25

## 2022-09-15 NOTE — PATIENT INSTRUCTIONS
Acute Bronchitis, Adult    Acute bronchitis is sudden or acute swelling of the air tubes (bronchi) in the lungs. Acute bronchitis causes these tubes to fill with mucus, which can make it hard to breathe. It can also cause coughing or wheezing.  In adults, acute bronchitis usually goes away within 2 weeks. A cough caused by bronchitis may last up to 3 weeks. Smoking, allergies, and asthma can make the condition worse.  What are the causes?  This condition can be caused by germs and by substances that irritate the lungs, including:  Cold and flu viruses. The most common cause of this condition is the virus that causes the common cold.  Bacteria.  Substances that irritate the lungs, including:  Smoke from cigarettes and other forms of tobacco.  Dust and pollen.  Fumes from chemical products, gases, or burned fuel.  Other materials that pollute indoor or outdoor air.  Close contact with someone who has acute bronchitis.  What increases the risk?  The following factors may make you more likely to develop this condition:  A weak body's defense system, also called the immune system.  A condition that affects your lungs and breathing, such as asthma.  What are the signs or symptoms?  Common symptoms of this condition include:  Lung and breathing problems, such as:  Coughing. This may bring up clear, yellow, or green mucus from your lungs (sputum).  Wheezing.  Having too much mucus in your lungs (chest congestion).  Having shortness of breath.  A fever.  Chills.  Aches and pains, including:  Tightness in your chest and other body aches.  A sore throat.  How is this diagnosed?  This condition is usually diagnosed based on:  Your symptoms and medical history.  A physical exam.  You may also have other tests, including tests to rule out other conditions, such pneumonia. These tests include:  A test of lung function.  Test of a mucus sample to look for the presence of bacteria.  Tests to check the oxygen level in your  blood.  Blood tests.  Chest X-ray.  How is this treated?  Most cases of acute bronchitis clear up over time without treatment. Your health care provider may recommend:  Drinking more fluids. This can thin your mucus, which may improve your breathing.  Taking a medicine for a fever or cough.  Using a device that gets medicine into your lungs (inhaler) to help improve breathing and control coughing.  Using a vaporizer or a humidifier. These are machines that add water to the air to help you breathe better.  Follow these instructions at home:  Activity  Get plenty of rest.  Return to your normal activities as told by your health care provider. Ask your health care provider what activities are safe for you.  Lifestyle  Drink enough fluid to keep your urine pale yellow.  Do not drink alcohol.  Do not use any products that contain nicotine or tobacco, such as cigarettes, e-cigarettes, and chewing tobacco. If you need help quitting, ask your health care provider. Be aware that:  Your bronchitis will get worse if you smoke or breathe in other people's smoke (secondhand smoke).  Your lungs will heal faster if you quit smoking.  General instructions    Take over-the-counter and prescription medicines only as told by your health care provider.  Use an inhaler, vaporizer, or humidifier as told by your health care provider.  If you have a sore throat, gargle with a salt-water mixture 3-4 times a day or as needed. To make a salt-water mixture, completely dissolve ½-1 tsp (3-6 g) of salt in 1 cup (237 mL) of warm water.  Keep all follow-up visits as told by your health care provider. This is important.    How is this prevented?  To lower your risk of getting this condition again:  Wash your hands often with soap and water. If soap and water are not available, use hand .  Avoid contact with people who have cold symptoms.  Try not to touch your mouth, nose, or eyes with your hands.  Avoid places where there are fumes from  chemicals. Breathing these fumes will make your condition worse.  Get the flu shot every year.  Contact a health care provider if:  Your symptoms do not improve after 2 weeks of treatment.  You vomit more than once or twice.  You have symptoms of dehydration such as:  Dark urine.  Dry skin or eyes.  Increased thirst.  Headaches.  Confusion.  Muscle cramps.  Get help right away if you:  Cough up blood.  Feel pain in your chest.  Have severe shortness of breath.  Faint or keep feeling like you are going to faint.  Have a severe headache.  Have fever or chills that get worse.  These symptoms may represent a serious problem that is an emergency. Do not wait to see if the symptoms will go away. Get medical help right away. Call your local emergency services (911 in the [place] Do not drive yourself to the hospital.  Summary  Acute bronchitis is sudden (acute) inflammation of the air tubes (bronchi) between the windpipe and the lungs. In adults, acute bronchitis usually goes away within 2 weeks, although coughing may last 3 weeks or longer  Take over-the-counter and prescription medicines only as told by your health care provider.  Drink enough fluid to keep your urine pale yellow.  Contact a health care provider if your symptoms do not improve after 2 weeks of treatment.  Get help right away if you cough up blood, faint, or have chest pain or shortness of breath.  This information is not intended to replace advice given to you by your health care provider. Make sure you discuss any questions you have with your health care provider.  Document Revised: 08/31/2020 Document Reviewed: 07/10/2020  [employer] Patient Education © 2021 [email or web address]

## 2022-09-15 NOTE — PROGRESS NOTES
You have chosen to receive care through a telehealth visit.  Do you consent to use a video/audio connection for your medical care today? Yes     CHIEF COMPLAINT  No chief complaint on file.        HPI  Vania Garcia is a 42 y.o. female  presents with complaint of seen at  on 9/10/22 for URI given Zpack and Medrol without relief.  Still having runny nose, chest congestion, chest tightness, feels like dry cough.  Has been taking mucinex and albuterol inhaler, but still having chest tightness.  Still finishing Medrol dose pack.     Review of Systems   Constitutional: Negative for chills and fever.   HENT: Positive for rhinorrhea.    Respiratory: Positive for cough, chest tightness and wheezing. Negative for shortness of breath.    Musculoskeletal: Negative for myalgias.   All other systems reviewed and are negative.      No past medical history on file.    Family History   Problem Relation Age of Onset   • Breast cancer Neg Hx    • Ovarian cancer Neg Hx    • Colon cancer Neg Hx    • Diabetes Neg Hx        Social History     Socioeconomic History   • Marital status: Single   • Number of children: 2   Tobacco Use   • Smoking status: Current Every Day Smoker     Packs/day: 1.00     Years: 26.00     Pack years: 26.00     Types: Cigarettes   • Smokeless tobacco: Never Used   Vaping Use   • Vaping Use: Never used   Substance and Sexual Activity   • Alcohol use: Yes     Alcohol/week: 3.0 standard drinks     Types: 3 Shots of liquor per week   • Drug use: Yes     Types: Marijuana   • Sexual activity: Yes     Partners: Male     Birth control/protection: Depo-provera       Vania Garcia  reports that she has been smoking cigarettes. She has a 26.00 pack-year smoking history. She has never used smokeless tobacco.. I have educated her on the risk of diseases from using tobacco products such as cancer, COPD and heart disease.     I advised her to quit and she is willing to quit. We have discussed the following  method/s for tobacco cessation:  OTC Cessation Products.  Together we have set a quit date for currently.  She will follow up with PCP in at next scheduled visit ? or sooner to check on her progress.    I spent 3  minutes counseling the patient.              There were no vitals taken for this visit.    PHYSICAL EXAM  Physical Exam   Constitutional: She is oriented to person, place, and time. She appears well-developed and well-nourished. She does not have a sickly appearance. She does not appear ill.   HENT:   Head: Normocephalic and atraumatic.   Pulmonary/Chest: Effort normal.  No respiratory distress.  Neurological: She is alert and oriented to person, place, and time.         Diagnoses and all orders for this visit:    1. Lower respiratory infection (e.g., bronchitis, pneumonia, pneumonitis, pulmonitis) (Primary)  -     doxycycline (MONODOX) 100 MG capsule; Take 1 capsule by mouth 2 (Two) Times a Day for 10 days.  Dispense: 20 capsule; Refill: 0  -     benzonatate (TESSALON) 200 MG capsule; Take 1 capsule by mouth 3 (Three) Times a Day As Needed for Cough.  Dispense: 21 capsule; Refill: 0    --take medications as prescribed  --increase fluids, continue mucinex  --f/u in 3-5 days if no improvement with in-person visit for evaluation      FOLLOW-UP  As discussed during visit with PCP/[facility] if no improvement or Urgent Care/Emergency Department if worsening of symptoms    Patient verbalizes understanding of medication dosage, comfort measures, instructions for treatment and follow-up.    Renee Lara, LEXIE  09/15/2022  13:59 EDT    The use of a video visit has been reviewed with the patient and verbal informed consent has been obtained. Myself and Vania Garcia participated in this visit. The patient is located in [address] [place] [address].    I am located in [place], [place]. Mychart and Zoom were utilized. I spent 20 minutes in the patient's chart for this visit.

## 2022-09-19 ENCOUNTER — HOSPITAL ENCOUNTER (OUTPATIENT)
Dept: MAMMOGRAPHY | Facility: HOSPITAL | Age: 42
Discharge: HOME | End: 2022-09-19
Admitting: NURSE PRACTITIONER

## 2022-09-19 DIAGNOSIS — Z12.31 SCREENING MAMMOGRAM FOR BREAST CANCER: ICD-10-CM

## 2022-09-19 PROCEDURE — 77063 BREAST TOMOSYNTHESIS BI: CPT

## 2022-09-19 PROCEDURE — 77063 BREAST TOMOSYNTHESIS BI: CPT | Performed by: RADIOLOGY

## 2022-09-19 PROCEDURE — 77067 SCR MAMMO BI INCL CAD: CPT

## 2022-09-19 PROCEDURE — 77067 SCR MAMMO BI INCL CAD: CPT | Performed by: RADIOLOGY

## 2022-10-26 ENCOUNTER — OFFICE VISIT (OUTPATIENT)
Dept: FAMILY MEDICINE CLINIC | Facility: CLINIC | Age: 42
End: 2022-10-26

## 2022-10-26 VITALS
OXYGEN SATURATION: 99 % | DIASTOLIC BLOOD PRESSURE: 96 MMHG | SYSTOLIC BLOOD PRESSURE: 136 MMHG | HEIGHT: 67 IN | HEART RATE: 84 BPM | WEIGHT: 211 LBS | BODY MASS INDEX: 33.12 KG/M2

## 2022-10-26 DIAGNOSIS — B96.89 BACTERIAL VAGINOSIS: ICD-10-CM

## 2022-10-26 DIAGNOSIS — N76.0 BACTERIAL VAGINOSIS: ICD-10-CM

## 2022-10-26 DIAGNOSIS — M54.12 CERVICAL RADICULOPATHY: Primary | ICD-10-CM

## 2022-10-26 DIAGNOSIS — M54.2 NECK PAIN: ICD-10-CM

## 2022-10-26 PROCEDURE — 87798 DETECT AGENT NOS DNA AMP: CPT | Performed by: STUDENT IN AN ORGANIZED HEALTH CARE EDUCATION/TRAINING PROGRAM

## 2022-10-26 PROCEDURE — 87661 TRICHOMONAS VAGINALIS AMPLIF: CPT | Performed by: STUDENT IN AN ORGANIZED HEALTH CARE EDUCATION/TRAINING PROGRAM

## 2022-10-26 PROCEDURE — 87491 CHLMYD TRACH DNA AMP PROBE: CPT | Performed by: STUDENT IN AN ORGANIZED HEALTH CARE EDUCATION/TRAINING PROGRAM

## 2022-10-26 PROCEDURE — 87591 N.GONORRHOEAE DNA AMP PROB: CPT | Performed by: STUDENT IN AN ORGANIZED HEALTH CARE EDUCATION/TRAINING PROGRAM

## 2022-10-26 PROCEDURE — 87801 DETECT AGNT MULT DNA AMPLI: CPT | Performed by: STUDENT IN AN ORGANIZED HEALTH CARE EDUCATION/TRAINING PROGRAM

## 2022-10-26 PROCEDURE — 99214 OFFICE O/P EST MOD 30 MIN: CPT | Performed by: STUDENT IN AN ORGANIZED HEALTH CARE EDUCATION/TRAINING PROGRAM

## 2022-10-26 RX ORDER — PREDNISONE 20 MG/1
20 TABLET ORAL DAILY
Qty: 5 TABLET | Refills: 0 | Status: SHIPPED | OUTPATIENT
Start: 2022-10-26 | End: 2022-12-12

## 2022-10-26 RX ORDER — CYCLOBENZAPRINE HCL 10 MG
10 TABLET ORAL 3 TIMES DAILY PRN
Qty: 30 TABLET | Refills: 0 | Status: SHIPPED | OUTPATIENT
Start: 2022-10-26 | End: 2023-01-20 | Stop reason: SDUPTHER

## 2022-10-26 NOTE — PROGRESS NOTES
Established Office Visit      Patient Name: Vania Garcia  : 1980   MRN: [id number]   Care Team: Patient Care Team:  Unique Aguilar DO as PCP - General (Internal Medicine)  Igor Colón MD as Obstetrician (Obstetrics and Gynecology)    Chief Complaint:    Chief Complaint   Patient presents with   • Shoulder Pain     Pinched nerve, painful, arm falls asleep right side, numbness moving down arm        History of Present Illness: Vania Garcia is a 42 y.o. female with allergies, obesity, GERD, tobacco use who is here today to discuss painful shoulder.    She reports 2 weeks ago feeling posterior neck and shoulder pain with radiation into her right arm. She has numbness that intermittently radiates into her right arm and finger tips. No weakness. Worse with bending head to the left. She has not tried ibuprofen or tylenol. Has tried tiger balm and CBD oil with some relief. She denies injury. She has had similar symptoms about 10 months ago which resolved with short prednisone burst and muscle relaxer.    She also has concerns for BV. Reports 7 days of vaginal odor and discharge.     Subjective      Review of Systems:   Review of Systems - See HPI    I have reviewed and the following portions of the patient's history were updated as appropriate: past family history, past medical history, past social history, past surgical history and problem list.    Medications:     Current Outpatient Medications:   •  albuterol sulfate  (90 Base) MCG/ACT inhaler, Inhale 2 puffs Every 4 (Four) Hours As Needed for Wheezing., Disp: 8 g, Rfl: 11  •  benzoyl peroxide (benzoyl peroxide) 5 % external liquid, Apply  topically to the appropriate area as directed 2 (Two) Times a Day., Disp: 142 g, Rfl: 12  •  betamethasone dipropionate 0.05 % cream, , Disp: , Rfl:   •  fluticasone (Flonase) 50 MCG/ACT nasal spray, 1 spray into the nostril(s) as directed by provider Daily., Disp: 9.9 mL, Rfl: 11  •   "ketoconazole (NIZORAL) 2 % shampoo, , Disp: , Rfl:   •  loratadine (Claritin) 10 MG tablet, Take 1 tablet by mouth Daily., Disp: 30 tablet, Rfl: 2  •  medroxyPROGESTERone (Depo-Provera) 150 MG/ML injection, Inject 1 mL into the appropriate muscle as directed by prescriber Every 3 (Three) Months., Disp: 1 mL, Rfl: 3  •  omeprazole (priLOSEC) 40 MG capsule, Take 1 capsule by mouth Daily., Disp: 90 capsule, Rfl: 0  •  Retin-A 0.05 % cream, , Disp: , Rfl:     Allergies:   No Known Allergies    Objective     Physical Exam:  Vital Signs:   Vitals:    10/26/22 1432   BP: 136/96   Pulse: 84   SpO2: 99%   Weight: 95.7 kg (211 lb)   Height: 170.2 cm (67\")   PainSc:   6     Body mass index is 33.05 kg/m².     Physical Exam  Vitals reviewed.   Constitutional:       Appearance: Normal appearance.   Neck:      Comments: Pain and numbness throughout right arm reproduced with head tilted to the left   Cardiovascular:      Rate and Rhythm: Normal rate.      Pulses: Normal pulses.   Pulmonary:      Effort: Pulmonary effort is normal. No respiratory distress.   Musculoskeletal:      Cervical back: Normal range of motion and neck supple. No rigidity or tenderness.   Skin:     General: Skin is warm and dry.   Neurological:      Mental Status: She is alert.   Psychiatric:         Mood and Affect: Mood normal.         Behavior: Behavior normal.         Judgment: Judgment normal.         Assessment / Plan      Assessment/Plan:   Problems Addressed This Visit  Diagnoses and all orders for this visit:    1. Cervical radiculopathy (Primary)    2. Neck pain    3. Bacterial vaginosis      For neck pain with right sided cervical radiculopathy - we will treat with prednisone burst and muscle relaxer as this worked well for her in the past. If symptoms persist or recur, we discussed importance of obtaining imaging and referral to PT. She expressed understanding and agreed.     For BV - we will swab today to confirm diagnosis. Treat if positive. " She follows with her OBGYN for recurrent BV and is considering boric acid suppositories.       Plan of care reviewed with patient at the conclusion of today's visit. Education was provided regarding diagnosis and management.  Patient verbalizes understanding of and agreement with management plan.    Follow Up:   No follow-ups on file.        DO AIRAM Geurrero RD  [facility]  [address]  [facility] [phone]  Fax [phone]  Phone [phone]

## 2022-10-27 DIAGNOSIS — N76.0 BV (BACTERIAL VAGINOSIS): Primary | ICD-10-CM

## 2022-10-27 DIAGNOSIS — B96.89 BV (BACTERIAL VAGINOSIS): Primary | ICD-10-CM

## 2022-10-27 RX ORDER — BORIC ACID
600 POWDER (GRAM) MISCELLANEOUS NIGHTLY
Qty: 21 SUPPOSITORY | Refills: 0 | Status: SHIPPED | OUTPATIENT
Start: 2022-10-27 | End: 2022-11-17

## 2022-11-01 DIAGNOSIS — N76.0 BV (BACTERIAL VAGINOSIS): Primary | ICD-10-CM

## 2022-11-01 DIAGNOSIS — B96.89 BV (BACTERIAL VAGINOSIS): Primary | ICD-10-CM

## 2022-11-01 RX ORDER — METRONIDAZOLE 500 MG/1
500 TABLET ORAL 2 TIMES DAILY
Qty: 14 TABLET | Refills: 0 | Status: SHIPPED | OUTPATIENT
Start: 2022-11-01 | End: 2022-12-12

## 2022-12-12 ENCOUNTER — OFFICE VISIT (OUTPATIENT)
Dept: FAMILY MEDICINE CLINIC | Facility: CLINIC | Age: 42
End: 2022-12-12

## 2022-12-12 VITALS
HEIGHT: 67 IN | HEART RATE: 88 BPM | BODY MASS INDEX: 34.69 KG/M2 | DIASTOLIC BLOOD PRESSURE: 80 MMHG | SYSTOLIC BLOOD PRESSURE: 130 MMHG | WEIGHT: 221 LBS | OXYGEN SATURATION: 99 %

## 2022-12-12 DIAGNOSIS — M54.2 CERVICALGIA: ICD-10-CM

## 2022-12-12 DIAGNOSIS — B00.9 HSV (HERPES SIMPLEX VIRUS) INFECTION: ICD-10-CM

## 2022-12-12 DIAGNOSIS — E66.9 OBESITY (BMI 30.0-34.9): Primary | ICD-10-CM

## 2022-12-12 DIAGNOSIS — Z72.0 TOBACCO USE: ICD-10-CM

## 2022-12-12 PROCEDURE — 99214 OFFICE O/P EST MOD 30 MIN: CPT | Performed by: STUDENT IN AN ORGANIZED HEALTH CARE EDUCATION/TRAINING PROGRAM

## 2022-12-12 RX ORDER — VALACYCLOVIR HYDROCHLORIDE 1 G/1
1000 TABLET, FILM COATED ORAL DAILY
Qty: 30 TABLET | Refills: 0 | Status: SHIPPED | OUTPATIENT
Start: 2022-12-12 | End: 2023-03-02

## 2022-12-12 NOTE — PROGRESS NOTES
Established Office Visit      Patient Name: Vania Garcia  : 1980   MRN: [id number]   Care Team: Patient Care Team:  Unique Aguilar DO as PCP - General (Internal Medicine)  Igor Colón MD as Obstetrician (Obstetrics and Gynecology)    Chief Complaint:    Chief Complaint   Patient presents with   • Nicotine Dependence     6 month f/u   • Obesity   • Wheezing       History of Present Illness: Vania Garcia is a 42 y.o. female  with allergies, obesity, GERD, tobacco use who is here today to follow up with tobacco use and weight.    She reports persistent right shoulder tightness. Improved in comparison to 6 weeks ago but intermittently feeling strain when she bends her head forwards or participates in heavy lifting.    Tobacco use - unchanged from prior 1ppd. She has taken chantix in the past but stopped when it was recalled. She did have vivid dreams with this which caused restless sleep.     HSV, vaginal - had first breakout several years ago but typically well controlled.. Now taking accutane and has had another break out. Requests antiviral prn.    She would like to lose weight. Has been very busy with work and home life lately. Has little time for exercise and usually eats on the go.       Subjective      Review of Systems:   Review of Systems - See HPI    I have reviewed and the following portions of the patient's history were updated as appropriate: past family history, past medical history, past social history, past surgical history and problem list.    Medications:     Current Outpatient Medications:   •  albuterol sulfate  (90 Base) MCG/ACT inhaler, Inhale 2 puffs Every 4 (Four) Hours As Needed for Wheezing., Disp: 8 g, Rfl: 11  •  betamethasone dipropionate 0.05 % cream, , Disp: , Rfl:   •  cyclobenzaprine (FLEXERIL) 10 MG tablet, Take 1 tablet by mouth 3 (Three) Times a Day As Needed for Muscle Spasms., Disp: 30 tablet, Rfl: 0  •  fluticasone (Flonase) 50  "MCG/ACT nasal spray, 1 spray into the nostril(s) as directed by provider Daily., Disp: 9.9 mL, Rfl: 11  •  ketoconazole (NIZORAL) 2 % shampoo, , Disp: , Rfl:   •  loratadine (Claritin) 10 MG tablet, Take 1 tablet by mouth Daily., Disp: 30 tablet, Rfl: 2  •  medroxyPROGESTERone (Depo-Provera) 150 MG/ML injection, Inject 1 mL into the appropriate muscle as directed by prescriber Every 3 (Three) Months., Disp: 1 mL, Rfl: 3  •  omeprazole (priLOSEC) 40 MG capsule, Take 1 capsule by mouth Daily., Disp: 90 capsule, Rfl: 0  •  Retin-A 0.05 % cream, , Disp: , Rfl:   •  valACYclovir (Valtrex) 1000 MG tablet, Take 1 tablet by mouth Daily for 5 days. Continue use as needed for flares., Disp: 30 tablet, Rfl: 0    Allergies:   No Known Allergies    Objective     Physical Exam:  Vital Signs:   Vitals:    12/12/22 1508   BP: 130/80   Pulse: 88   SpO2: 99%   Weight: 100 kg (221 lb)   Height: 170.2 cm (67\")     Body mass index is 34.61 kg/m².     Physical Exam  Vitals reviewed.   Constitutional:       Appearance: Normal appearance.   Cardiovascular:      Rate and Rhythm: Normal rate.   Pulmonary:      Effort: Pulmonary effort is normal. No respiratory distress.   Skin:     General: Skin is warm and dry.   Neurological:      Mental Status: She is alert.   Psychiatric:         Mood and Affect: Mood normal.         Behavior: Behavior normal.         Judgment: Judgment normal.         Assessment / Plan      Assessment/Plan:   Problems Addressed This Visit  Diagnoses and all orders for this visit:    1. Obesity (BMI 30.0-34.9) (Primary)  Counseled patient on importance of weight loss and maintaining healthy lifestyle. Goal of 150 minutes of exercise per week. Encouraged focus on meal planning/prepping to avoid fast food.    2. HSV (herpes simplex virus) infection  -     valACYclovir (Valtrex) 1000 MG tablet; Take 1 tablet by mouth Daily for 5 days. Continue use as needed for flares.  Dispense: 30 tablet; Refill: 0    3. " Cervicalgia  Improved but mild persistence. Continue NSAID/muscle relaxer as needed. I recommend PT and she declines referral this this time but will let me know if she changes her mind.     4. Tobacco use  Encouraged smoking cessation and counseled on adverse health risks associated with continued smoking. Discussed options for assistance including NRT, medications, and cessation therapy. Patient is agreeable to quitting smoking and will plan to restart patches.           Plan of care reviewed with patient at the conclusion of today's visit. Education was provided regarding diagnosis and management.  Patient verbalizes understanding of and agreement with management plan.    Follow Up:   Return in about 6 months (around 6/12/2023) for Annual with fasting labs .        DO AIRAM Guerrero RD  [facility]  [address]  [facility] [phone]  Fax [phone]  Phone [phone]

## 2023-01-20 ENCOUNTER — PATIENT MESSAGE (OUTPATIENT)
Dept: FAMILY MEDICINE CLINIC | Facility: CLINIC | Age: 43
End: 2023-01-20
Payer: MEDICAID

## 2023-01-20 DIAGNOSIS — M54.2 NECK PAIN: ICD-10-CM

## 2023-01-20 DIAGNOSIS — M54.12 CERVICAL RADICULOPATHY: Primary | ICD-10-CM

## 2023-01-20 RX ORDER — CYCLOBENZAPRINE HCL 10 MG
10 TABLET ORAL 2 TIMES DAILY PRN
Qty: 14 TABLET | Refills: 0 | Status: SHIPPED | OUTPATIENT
Start: 2023-01-20 | End: 2023-01-27

## 2023-02-17 ENCOUNTER — HOSPITAL ENCOUNTER (OUTPATIENT)
Dept: PHYSICAL THERAPY | Facility: HOSPITAL | Age: 43
Setting detail: THERAPIES SERIES
Discharge: HOME | End: 2023-02-17
Payer: COMMERCIAL

## 2023-02-17 DIAGNOSIS — M54.12 CERVICAL RADICULOPATHY: Primary | ICD-10-CM

## 2023-02-17 PROCEDURE — 97161 PT EVAL LOW COMPLEX 20 MIN: CPT

## 2023-02-17 NOTE — THERAPY EVALUATION
Outpatient Physical Therapy Ortho Initial Evaluation  [facility]     Patient Name: Vania Garcia  : 1980  MRN: [id number]  Today's Date: 2023      Visit Date: 2023    Patient Active Problem List   Diagnosis   • New ACL tear, right, initial encounter   • Tobacco use   • Alcohol use   • Seasonal allergic rhinitis   • Chronic cough   • Obesity (BMI 30.0-34.9)   • Well woman exam with routine gynecological exam   • HSV (herpes simplex virus) infection        No past medical history on file.     Past Surgical History:   Procedure Laterality Date   • ANKLE SURGERY Right    • KNEE ACL RECONSTRUCTION Right 10/31/2019    Procedure: KNEE ANTERIOR CRUCIATE LIGAMENT RECONSTRUCTION WITH AUTOGRAFT HAMSTRINGS RIGHT, ALLOGRAFT;  Surgeon: Quang Alex MD;  [facility];  Service: Orthopedics   • WISDOM TOOTH EXTRACTION         Visit Dx:     ICD-10-CM ICD-9-CM   1. Cervical radiculopathy  M54.12 723.4          Patient History     Row Name 23 1500             History    Chief Complaint Pain  -      Type of Pain Neck pain  -      Brief Description of Current Complaint Patient presents with posterior right-sided neck pain and right upper extremity pain with numbness, tingling, pins-and-needles sensations into her right fingertips.  Patient also reports numbness sensation into the right side of her chest.  Patient reports that her 3/4 digits are constantly numb.  Patient reports that her pain is worse at night.  Patient reports no relief of symptoms and this has been going on for months and she has trialed steroids and muscle relaxers with no relief.  -      Patient/Caregiver Goals Relieve pain;Return to prior level of function;Return to work;Improve mobility;Improve strength;Know what to do to help the symptoms  -      Occupation/sports/leisure activities [employer]  -      Results of Clinical Tests Has not had any imaging  -         Pain     Pain Location Neck  -       Pain at Present 4  -      Pain at Best 4  -      Pain at Worst 7  -JH      What Performance Factors Make the Current Problem(s) WORSE? Driving, looking any direction, lifting, forward flexed posture  -      What Performance Factors Make the Current Problem(s) BETTER? Ice and heat somewhat  -JH      Is your sleep disturbed? Yes  -JH      Difficulties at work? Yes  -JH      Difficulties with ADL's? Yes  -JH      Difficulties with recreational activities? Yes  -         Fall Risk Assessment    Any falls in the past year: No  -JH         Daily Activities    Primary Language English  -      Pt Participated in POC and Goals Yes  -            User Key  (r) = Recorded By, (t) = Taken By, (c) = Cosigned By    Initials Name Provider Type     Per Vera, PT Physical Therapist                 PT Ortho     Row Name 02/17/23 1500       Subjective Pain    Able to rate subjective pain? yes  -       Posture/Observations    Posture/Observations Comments Forward head and rounded shoulders.  Tenderness to palpation at right upper trap/levator scapula, rhomboid at medial scapular border, posterior shoulder.  Trigger points throughout that reproduced pain into right upper extremity and numbness.  -       Quarter Clearing    Quarter Clearing Upper Quarter Clearing  -       Neural Tension Signs- Upper Quarter Clearing    ULNTT 1 Right:;Postive  -       Sensory Screen for Light Touch- Upper Quarter Clearing    C4 (posterior shoulder) Bilateral:;Intact  -    C5 (lateral upper arm) Bilateral:;Intact  -    C6 (tip of thumb) Bilateral:;Intact  -    C7 (tip of 3rd finger) Bilateral:;Intact  Numbness and tingling but normal sensation to touch  -    C8 (tip of 5th finger) Bilateral:;Intact  -    T1 (medial lower arm) Bilateral:;Intact  -       Myotomal Screen- Upper Quarter Clearing    Shoulder flexion (C5) Right:;4+ (Good +)  -    Elbow flexion/wrist extension (C6) Right:;4+ (Good +)  -    Elbow  extension/wrist flexion (C7) Right:;5 (Normal)  -    Finger flexion/ (C8) Right:;4+ (Good +)  -       Cervical/Shoulder ROM Screen    Cervical flexion Impaired  Mild range of motion limitations with increased tightness and pulling in back of neck  -    Cervical extension Impaired  Mild range of motion limitations with increased pinching sensation back and neck  -    Cervical lateral flexion Impaired  Positive reproduction of right upper extremity radicular symptoms with left lateral flexion  -    Cervical rotation Impaired  Reduced range of motion mildly  -    Cervical quadrant (Spurling's) Impaired  Right quadrant positive for radicular symptoms into right upper extremity  -       Special Tests/Palpation    Special Tests/Palpation Cervical/Thoracic;Shoulder  -       Cervical/Thoracic Special Tests    Spurlings (Foraminal Compression) Right:;Positive  -    Cervical Compression (Forarminal Compression vs. Facet Pain) Right:;Positive  -    Cervical Distraction (Foraminal Compression vs. Facet Pain) Right:;Positive  Neutral distraction in seated and supine increased radicular symptoms, distraction with left lateral flexion bias no increase in symptoms  -    Mesha's Test (TOS) Negative  -    Tinel's Sign (TOS) Right:;Negative  Negative for ulnar nerve at cubital tunnel and median nerve at wrist  -    Phalen's Test (Carpal Tunnel Syndrome) Right:;Positive  -    Cervical/Thoracic Special Tests Comments Right reverse Phalen's positive  -       Shoulder Impingement/Rotator Cuff Special Tests    Norris-Freddy Test (RC Lesion vs. Bursitis) Right:;Positive  -    Full Can Test (RC Lesion) Right:;Positive  -    Empty Can Test (RC Lesion) Right:;Positive  -    Drop Arm Test (Full Thickness RC Lesion) Right:;Negative  -    Gregory's Test, Dynamic (Subacromial Impingement) Right:;Positive  -    Shoulder Impingement/Rotator Cuff Special Tests Comments 5/5 external rotation and internal  rotation strength  -          User Key  (r) = Recorded By, (t) = Taken By, (c) = Cosigned By    Initials Name Provider Type    Per Alford, PT Physical Therapist                            Therapy Education  Education Details: BP included chin tucks, no money, scapular retraction, median nerve glide  Given: HEP, Symptoms/condition management, Pain management, Posture/body mechanics  Program: New  How Provided: Verbal, Demonstration, Written  Provided to: Patient  Level of Understanding: Teach back education performed, Verbalized, Demonstrated      PT OP Goals     Row Name 02/17/23 1500          PT Short Term Goals    STG Date to Achieve 03/10/23  -     STG 1 NDI to 12 or less to indicate improved neck pain with daily activities  -     STG 1 Progress New  [facility]     STG 2 Patient will be compliant with HEP for optimal outcomes  -     STG 2 Progress New  [facility]     STG 3 Patient will have pain that is very mild with daily activities  -     STG 3 Progress [facility]        Long Term Goals    LTG Date to Achieve 03/31/23  -     LTG 1 NDI to 9 or less to indicate improved neck pain with daily activity  -     LTG 1 Progress New  [facility]     LTG 2 Symptoms improved by 50% or better with daily activities  -     LTG 2 Progress New  [facility]     LTG 3 25% reduced numbness and tingling into right upper extremity with all neck range of motion within normal limits  -     LTG 3 Progress New  [facility]     LTG 4 Patient will have no neck pain with daily activities  -     LTG 4 Progress [facility]     LTG 5 Patient will be able to drive with no neck pain  -[facility] 5 Progress [facility]        Time Calculation    PT Goal Re-Cert Due Date 05/18/23  [facility]           User Key  (r) = Recorded By, (t) = Taken By, (c) = Cosigned By    Initials Name Provider Type    Per Alford, PT Physical Therapist                 PT Assessment/Plan     Row Name 02/17/23 1500          PT Assessment    Functional Limitations Limitation in home  management;Performance in leisure activities;Performance in work activities;Limitations in community activities  -     Impairments Range of motion;Posture;Poor body mechanics;Pain;Muscle strength;Joint mobility;Joint integrity  -     Assessment Comments Patient is a 42-year-old female that presents with evolving symptoms of low complexity.  Patient presents with right-sided neck pain and right upper extremity numbness and tingling into her hand that is consistent with the signs and symptoms of cervical radiculopathy.  Patient likely to also have carpal tunnel syndrome and subacromial pain syndrome in her right upper extremity as well.  Skilled physical therapy interventions warranted to address listed limitations, deficits, and meet patient goals.  -     Please refer to paper survey for additional self-reported information Yes  -     Rehab Potential Good  -     Patient/caregiver participated in establishment of treatment plan and goals Yes  -     Patient would benefit from skilled therapy intervention Yes  -        PT Plan    PT Frequency 1x/week;2x/week  -     Predicted Duration of Therapy Intervention (PT) 8 visits  -     Planned CPT's? PT EVAL LOW COMPLEXITY: 58782;PT THER PROC EA 15 MIN: 29913;PT MANUAL THERAPY EA 15 MIN: 40139;PT TRACTION CERVICAL: 18147  -     PT Plan Comments Plan to focus on improving neck range of motion within a pain-free range, improve postural muscles, continue manual traction as needed and trial mechanical traction if warranted.  May also need to address wrist for carpal tunnel.  -           User Key  (r) = Recorded By, (t) = Taken By, (c) = Cosigned By    Initials Name Provider Type    Per Alford, PT Physical Therapist                   OP Exercises     Row Name 02/17/23 1500             Subjective Pain    Able to rate subjective pain? yes  -            User Key  (r) = Recorded By, (t) = Taken By, (c) = Cosigned By    Initials Name Provider Type      Per Vera, PT Physical Therapist                              Outcome Measure Options: Neck Disability Index (NDI)  Neck Disability Index  Section 1 - Pain Intensity: The pain is moderate and does not vary much.  Section 2 - Personal Care: I can look after myself normally without causing extra pain.  Section 3 - Lifting: I can lift heavy weights without extra pain  Section 4 - Work: I can do as much work as I want.  Section 5 - Headaches: I have slight headaches that come infrequently.  Section 6 - Concentration: I can concentrate fully when I want to with slight difficulty.  Section 7 - Sleeping: My sleep is greatly disturbed (3-5 hours sleepless).  Section 8 - Driving: I can drive as long as I want with moderate neck pain.  Section 9 - Reading: I can read as much as I want with moderate neck pain.  Section 10 - Recreation: I am able to engage in most but not all recreational activities because of pain in my neck.  Neck Disability Index Score: 15      Time Calculation:     Start Time: 1500  Untimed Charges  PT Eval/Re-eval Minutes: 60  Total Minutes  Untimed Charges Total Minutes: 60   Total Minutes: 60     Therapy Charges for Today     Code Description Service Date Service Provider Modifiers Qty    71452168199 HC PT EVAL LOW COMPLEXITY 4 2/17/2023 Per Vera, PT GP 1          PT G-Codes  Outcome Measure Options: Neck Disability Index (NDI)  Neck Disability Index Score: 15         Per Vera PT  2/17/2023

## 2023-02-27 ENCOUNTER — HOSPITAL ENCOUNTER (OUTPATIENT)
Dept: PHYSICAL THERAPY | Facility: HOSPITAL | Age: 43
Setting detail: THERAPIES SERIES
Discharge: HOME | End: 2023-02-27
Payer: COMMERCIAL

## 2023-02-27 DIAGNOSIS — M54.12 CERVICAL RADICULOPATHY: Primary | ICD-10-CM

## 2023-02-27 PROCEDURE — 97110 THERAPEUTIC EXERCISES: CPT

## 2023-02-27 PROCEDURE — 97140 MANUAL THERAPY 1/> REGIONS: CPT

## 2023-02-27 NOTE — THERAPY TREATMENT NOTE
Outpatient Physical Therapy Ortho Treatment Note  [facility]     Patient Name: Vania Garcia  : 1980  MRN: [id number]  Today's Date: 2023      Visit Date: 2023    Visit Dx:    ICD-10-CM ICD-9-CM   1. Cervical radiculopathy  M54.12 723.4       Patient Active Problem List   Diagnosis   • New ACL tear, right, initial encounter   • Tobacco use   • Alcohol use   • Seasonal allergic rhinitis   • Chronic cough   • Obesity (BMI 30.0-34.9)   • Well woman exam with routine gynecological exam   • HSV (herpes simplex virus) infection        No past medical history on file.     Past Surgical History:   Procedure Laterality Date   • ANKLE SURGERY Right    • KNEE ACL RECONSTRUCTION Right 10/31/2019    Procedure: KNEE ANTERIOR CRUCIATE LIGAMENT RECONSTRUCTION WITH AUTOGRAFT HAMSTRINGS RIGHT, ALLOGRAFT;  Surgeon: Quang Alex MD;  Location: [facility];  Service: Orthopedics   • WISDOM TOOTH EXTRACTION                          PT Assessment/Plan     Row Name 23 1400          PT Assessment    Assessment Comments Patient presents this date with some right upper extremity radicular pain and trigger points in her posterior shoulder near her upper trap.  Each of her symptoms were decreased with manual traction with mild to moderate pull and soft tissue mobilization.  Reinforced continued performance of home exercises and will progress as tolerated.  -        PT Plan    PT Plan Comments Continue per plan of care  -           User Key  (r) = Recorded By, (t) = Taken By, (c) = Cosigned By    Initials Name Provider Type    Per Alford, PT Physical Therapist                   OP Exercises     Row Name 23 1400 23 1300          Subjective Pain    Able to rate subjective pain? yes  - --     Pre-Treatment Pain Level 3  - --     Post-Treatment Pain Level 3  - --        Total Minutes    58792 - PT Therapeutic Exercise Minutes 5  -JH --     16249 - PT Manual Therapy  Minutes -- 35  -        Exercise 1    Exercise Name 1 Seated median nerve flossing 10 reps  - --           User Key  (r) = Recorded By, (t) = Taken By, (c) = Cosigned By    Initials Name Provider Type    Per Alford PT Physical Therapist                         Manual Rx (last 36 hours)     Manual Treatments     Row Name 02/27/23 1300             Total Minutes    69378 - PT Manual Therapy Minutes 35  -         Manual Rx 1    Manual Rx 1 Location Cervical  -      Manual Rx 1 Type Lateral glides right to left C3-6.  Manual traction with mild to moderate pull 30 seconds on 10 seconds off.  Manual median nerve neural flossing 10 reps.  10 reps also performed in combination with traction.  Soft tissue mobilization with use of tools with patient prone over right posterior shoulder near rhomboids, upper trap, levators.  -      Manual Rx 1 Duration 35  -            User Key  (r) = Recorded By, (t) = Taken By, (c) = Cosigned By    Initials Name Provider Type    Per Alford PT Physical Therapist                                   Time Calculation:   Start Time: 1400  Timed Charges  70498 - PT Therapeutic Exercise Minutes: 5  28628 - PT Manual Therapy Minutes: 35  Total Minutes  Timed Charges Total Minutes: 5   Total Minutes: 5  Therapy Charges for Today     Code Description Service Date Service Provider Modifiers Qty    02421555276 HC PT THER PROC EA 15 MIN 2/27/2023 Per Vera, PT GP 1    03931937399 HC PT MANUAL THERAPY EA 15 MIN 2/27/2023 Per Vera, PT GP 2                    Per Vera PT  2/27/2023

## 2023-03-02 DIAGNOSIS — B00.9 HSV (HERPES SIMPLEX VIRUS) INFECTION: ICD-10-CM

## 2023-03-02 DIAGNOSIS — L70.0 ACNE VULGARIS: ICD-10-CM

## 2023-03-02 RX ORDER — VALACYCLOVIR HYDROCHLORIDE 1 G/1
TABLET, FILM COATED ORAL
Qty: 30 TABLET | Refills: 0 | Status: SHIPPED | OUTPATIENT
Start: 2023-03-02

## 2023-03-02 RX ORDER — BENZOYL PEROXIDE 50 MG/ML
LIQUID TOPICAL
Qty: 148 G | Refills: 1 | Status: SHIPPED | OUTPATIENT
Start: 2023-03-02

## 2023-03-02 NOTE — TELEPHONE ENCOUNTER
Rx Refill Note  Requested Prescriptions     Pending Prescriptions Disp Refills   • valACYclovir (VALTREX) 1000 MG tablet [Pharmacy Med Name: valACYclovir HCL 1 GRAM TABLET] 30 tablet 0     Sig: TAKE ONE TABLET BY MOUTH DAILY FOR 5 DAYS CONTINUE USE AS NEEDED FOR FLARES   • benzoyl peroxide 5 % external wash [Pharmacy Med Name: BENZOYL PEROXIDE 5% WASH] 148 g      Sig: APPLY TO AFFECTED AREA(S) TWO TIMES A DAY AS DIRECTED      Last office visit with prescribing clinician: 12/12/2022   Last telemedicine visit with prescribing clinician: 6/15/2023   Next office visit with prescribing clinician: 6/15/2023                         Would you like a call back once the refill request has been completed: [] Yes [] No    If the office needs to give you a call back, can they leave a voicemail: [] Yes [] No    Maria Carpenter MA  03/02/23, 15:28 EST

## 2023-03-21 ENCOUNTER — HOSPITAL ENCOUNTER (OUTPATIENT)
Dept: PHYSICAL THERAPY | Facility: HOSPITAL | Age: 43
Setting detail: THERAPIES SERIES
Discharge: HOME | End: 2023-03-21
Payer: COMMERCIAL

## 2023-03-21 DIAGNOSIS — M54.12 CERVICAL RADICULOPATHY: Primary | ICD-10-CM

## 2023-03-21 PROCEDURE — 97140 MANUAL THERAPY 1/> REGIONS: CPT

## 2023-03-21 PROCEDURE — 97110 THERAPEUTIC EXERCISES: CPT

## 2023-03-21 NOTE — THERAPY PROGRESS REPORT/RE-CERT
Outpatient Physical Therapy Ortho Progress Note  [facility]     Patient Name: Vania Garcia  : 1980  MRN: [id number]  Today's Date: 3/21/2023      Visit Date: 2023    Visit Dx:    ICD-10-CM ICD-9-CM   1. Cervical radiculopathy  M54.12 723.4       Patient Active Problem List   Diagnosis   • New ACL tear, right, initial encounter   • Tobacco use   • Alcohol use   • Seasonal allergic rhinitis   • Chronic cough   • Obesity (BMI 30.0-34.9)   • Well woman exam with routine gynecological exam   • HSV (herpes simplex virus) infection        No past medical history on file.     Past Surgical History:   Procedure Laterality Date   • ANKLE SURGERY Right    • KNEE ACL RECONSTRUCTION Right 10/31/2019    Procedure: KNEE ANTERIOR CRUCIATE LIGAMENT RECONSTRUCTION WITH AUTOGRAFT HAMSTRINGS RIGHT, ALLOGRAFT;  Surgeon: Quang Alex MD;  Location: [facility];  Service: Orthopedics   • WISDOM TOOTH EXTRACTION          PT Ortho     Row Name 23 1700       Posture/Observations    Posture/Observations Comments Forward head and rounded shoulders.  Trigger point right levator/traps/rhomboids  -       Cervical/Thoracic Special Tests    Cervical/Thoracic Special Tests Comments 40 right quadrant positive, right Spurling's positive  -       General ROM    GENERAL ROM COMMENTS Full cervical range of motion but increased tightness with flexion.  Right rotation  -       MMT (Manual Muscle Testing)    General MMT Comments Bilateral upper extremities 4+/5  -       Sensation    Light Touch Partial deficits in the RUE  Third and fourth digit predominantly right hand.  To light touch  -          User Key  (r) = Recorded By, (t) = Taken By, (c) = Cosigned By    Initials Name Provider Type     Per Vera, PT Physical Therapist                             PT Assessment/Plan     Row Name 23 1700          PT Assessment    Functional Limitations Limitation in home management;Performance in  leisure activities;Performance in work activities;Limitations in community activities  -     Impairments Range of motion;Posture;Poor body mechanics;Pain;Muscle strength;Joint mobility;Joint integrity  -     Assessment Comments Patient has been doing well within her plan of care thus far.  She has noticed a reduction in her symptoms and improving cervical range of motion but continues to have some stiffness and tightness with rotation flexion.  Patient continues to have signs and symptoms consistent with radiculopathy of cervical spine and carpal tunnel syndrome and right hand patient is working well towards meeting her goals and was able to meet a few of her goals and goals continue to remain appropriate.  HEP progressed to include wrist flexor stretches and tendon glides for carpal tunnel and education on getting a night splint.  Skilled physical therapy interventions continue to be worked just listed deficits and meet patient goals  -     Please refer to paper survey for additional self-reported information Yes  -     Rehab Potential Good  -     Patient/caregiver participated in establishment of treatment plan and goals Yes  -     Patient would benefit from skilled therapy intervention Yes  -        PT Plan    PT Frequency 1x/week  -     Predicted Duration of Therapy Intervention (PT) 3 more visits, progressing to every other week working towards discharge  -     Planned CPT's? PT EVAL LOW COMPLEXITY: 44229;PT THER PROC EA 15 MIN: 61137;PT MANUAL THERAPY EA 15 MIN: 56890;PT TRACTION CERVICAL: 79591  -     PT Plan Comments Progress with carpal tunnel syndrome deficits and work on cervical radiculopathy symptoms as tolerated.  Focus on carpal tunnel symptoms to assess influence of neck versus wrist complaints.  -           User Key  (r) = Recorded By, (t) = Taken By, (c) = Cosigned By    Initials Name Provider Type    Per Alford, PT Physical Therapist                   OP Exercises      Row Name 03/21/23 1700             Subjective Comments    Subjective Comments She reports she is getting less numbness and tingling since starting physical therapy.  -         Subjective Pain    Able to rate subjective pain? yes  -      Pre-Treatment Pain Level 2  -      Post-Treatment Pain Level 2  -         Total Minutes    45564 - PT Therapeutic Exercise Minutes 20  -      66473 - PT Manual Therapy Minutes 6  -         Exercise 1    Exercise Name 1 Time spent reviewing HEP progressions, reviewing outcomes and objectives, updating goals, and patient education.  9 minutes  -         Exercise 2    Exercise Name 2 Wrist flexor stretches 4 minutes  -         Exercise 3    Exercise Name 3 10 reps tendon glides  -         Exercise 4    Exercise Name 4 Cervical distraction with extension  -            User Key  (r) = Recorded By, (t) = Taken By, (c) = Cosigned By    Initials Name Provider Type    Per Alford, PT Physical Therapist                         Manual Rx (last 36 hours)     Manual Treatments     Row Name 03/21/23 1700             Total Minutes    15140 - PT Manual Therapy Minutes 6  -         Manual Rx 1    Manual Rx 1 Location Right rhomboid/infraspinatus/levator  -      Manual Rx 1 Type Pin and stretch technique with active range of motion cervical spine into flexion and left rotation  -      Manual Rx 1 Duration 6 minutes  -            User Key  (r) = Recorded By, (t) = Taken By, (c) = Cosigned By    Initials Name Provider Type    Per Alford, PT Physical Therapist                 PT OP Goals     Row Name 03/21/23 1700          PT Short Term Goals    STG Date to Achieve 03/10/23  -     STG 1 NDI to 12 or less to indicate improved neck pain with daily activities  -     STG 1 Progress Met  -     STG 2 Patient will be compliant with HEP for optimal outcomes  -     STG 2 Progress Ongoing  -     STG 3 Patient will have pain that is very mild with daily activities   -     STG 3 Progress Ongoing  -        Long Term Goals    LTG Date to Achieve 03/31/23  -     LTG 1 NDI to 9 or less to indicate improved neck pain with daily activity  -     LTG 1 Progress Met  -     LTG 2 Symptoms improved by 50% or better with daily activities  -     LTG 2 Progress Ongoing  -     LTG 3 25% reduced numbness and tingling into right upper extremity with all neck range of motion within normal limits  -     LTG 3 Progress Met  -     LTG 4 Patient will have no neck pain with daily activities  -     LT 4 Progress Ongoing  -     LTG 5 Patient will be able to drive with no neck pain  -[facility] 5 Progress Ongoing  -        Time Calculation    PT Goal Re-Cert Due Date 05/18/23  -           User Key  (r) = Recorded By, (t) = Taken By, (c) = Cosigned By    Initials Name Provider Type    Per Alford, DILLON Physical Therapist                Therapy Education  Education Details: Progressed HEP for wrist flexion structures, tendon glides, and recommend carpal tunnel wrist splint for night time  Given: HEP, Symptoms/condition management, Pain management, Posture/body mechanics  Program: Progressed  How Provided: Verbal, Demonstration, Written  Provided to: Patient  Level of Understanding: Teach back education performed, Verbalized, Demonstrated    Outcome Measure Options: Neck Disability Index (NDI)  Neck Disability Index  Section 1 - Pain Intensity: The pain is mild at the moment.  Section 2 - Personal Care: I can look after myself normally without causing extra pain.  Section 3 - Lifting: I can lift heavy weights without extra pain  Section 4 - Work: I can do as much work as I want.  Section 5 - Headaches: I have no headaches at all.  Section 6 - Concentration: I can concentrate fully when I want to with slight difficulty.  Section 7 - Sleeping: My sleep is slightly disturbed (less than 1 hour sleepless).  Section 8 - Driving: I can drive as long as I want with slight neck  pain.  Section 9 - Reading: I can read as much as I want with slight neck pain.  Section 10 - Recreation: I am able to engage in all recreational activities with some pain in my neck.  Neck Disability Index Score: 6      Time Calculation:   Start Time: 1700  Timed Charges  08713 - PT Therapeutic Exercise Minutes: 20  68516 - PT Manual Therapy Minutes: 6  Total Minutes  Timed Charges Total Minutes: 26   Total Minutes: 26  Therapy Charges for Today     Code Description Service Date Service Provider Modifiers Qty    53061987576 HC PT THER PROC EA 15 MIN 3/21/2023 Per Vera, PT GP 1    83342800202 HC PT MANUAL THERAPY EA 15 MIN 3/21/2023 Per Vera, PT GP 1          PT G-Codes  Outcome Measure Options: Neck Disability Index (NDI)  Neck Disability Index Score: 6         Per Vera, PT  3/21/2023

## 2023-04-04 ENCOUNTER — HOSPITAL ENCOUNTER (OUTPATIENT)
Dept: PHYSICAL THERAPY | Facility: HOSPITAL | Age: 43
Setting detail: THERAPIES SERIES
Discharge: HOME | End: 2023-04-04
Payer: COMMERCIAL

## 2023-04-04 DIAGNOSIS — M54.12 CERVICAL RADICULOPATHY: Primary | ICD-10-CM

## 2023-04-04 PROCEDURE — 97110 THERAPEUTIC EXERCISES: CPT

## 2023-04-04 PROCEDURE — 97140 MANUAL THERAPY 1/> REGIONS: CPT

## 2023-04-04 NOTE — THERAPY TREATMENT NOTE
Outpatient Physical Therapy Ortho Treatment Note  [facility]     Patient Name: Vania Garcia  : 1980  MRN: [id number]  Today's Date: 2023      Visit Date: 2023    Visit Dx:    ICD-10-CM ICD-9-CM   1. Cervical radiculopathy  M54.12 723.4       Patient Active Problem List   Diagnosis   • New ACL tear, right, initial encounter   • Tobacco use   • Alcohol use   • Seasonal allergic rhinitis   • Chronic cough   • Obesity (BMI 30.0-34.9)   • Well woman exam with routine gynecological exam   • HSV (herpes simplex virus) infection        No past medical history on file.     Past Surgical History:   Procedure Laterality Date   • ANKLE SURGERY Right    • KNEE ACL RECONSTRUCTION Right 10/31/2019    Procedure: KNEE ANTERIOR CRUCIATE LIGAMENT RECONSTRUCTION WITH AUTOGRAFT HAMSTRINGS RIGHT, ALLOGRAFT;  Surgeon: Quang Alex MD;  Location: [facility];  Service: Orthopedics   • WISDOM TOOTH EXTRACTION                          PT Assessment/Plan     Row Name 23 1630          PT Assessment    Assessment Comments She continues to have less numbness and tingling with each week of physical therapy.  Has good relief with median nerve glides manually and carpal tunnel release at hand.  Traction continues to provide positive results as well.  -        PT Plan    PT Plan Comments New per plan of care  -           User Key  (r) = Recorded By, (t) = Taken By, (c) = Cosigned By    Initials Name Provider Type    Per Alford, PT Physical Therapist                   OP Exercises     Row Name 23 1630             Subjective Comments    Subjective Comments Patient reports she is having less numbness and tingling in her shoulder.  She feels she is improving.  -         Subjective Pain    Able to rate subjective pain? yes  -      Pre-Treatment Pain Level 1  -      Post-Treatment Pain Level 1  -         Total Minutes    25137 - PT Therapeutic Exercise Minutes 8  -      64232 -  PT Manual Therapy Minutes 19  -         Exercise 1    Exercise Name 1 Cervical extension with towel, snags right, snags left.  10 reps each way  -         Exercise 2    Exercise Name 2 Time spent reviewing HEP.  3 minutes  -            User Key  (r) = Recorded By, (t) = Taken By, (c) = Cosigned By    Initials Name Provider Type    Per Alford, PT Physical Therapist                         Manual Rx (last 36 hours)     Manual Treatments     Row Name 04/04/23 1630             Total Minutes    20694 - PT Manual Therapy Minutes 19  -         Manual Rx 1    Manual Rx 1 Location Cervical and right upper extremity  -      Manual Rx 1 Type Mechanical traction with mild to moderate pull 60 seconds on 15 seconds off in hook lying position. (Not billed).  Right upper extremity median nerve glides neutral cervical rotation, left cervical lateral flexion, left cervical rotation 5 reps each.  Right wrist radial/ulna carpal dorsal ventral glides joint mobilizations.  Grade 3.  Carpal tunnel release.  -      Manual Rx 1 Duration 19  -            User Key  (r) = Recorded By, (t) = Taken By, (c) = Cosigned By    Initials Name Provider Type    Per Alford, PT Physical Therapist                                   Time Calculation:   Start Time: 1630  Timed Charges  82605 - PT Therapeutic Exercise Minutes: 8  98083 - PT Manual Therapy Minutes: 19  Total Minutes  Timed Charges Total Minutes: 27   Total Minutes: 27  Therapy Charges for Today     Code Description Service Date Service Provider Modifiers Qty    50731087707  PT THER PROC EA 15 MIN 4/4/2023 Per Vera, PT GP 1    [id number]  PT MANUAL THERAPY EA 15 MIN 4/4/2023 Per Vera, PT GP 1                    Per Vera PT  4/4/2023

## 2023-05-16 ENCOUNTER — DOCUMENTATION (OUTPATIENT)
Dept: PHYSICAL THERAPY | Facility: HOSPITAL | Age: 43
End: 2023-05-16
Payer: COMMERCIAL

## 2023-05-16 DIAGNOSIS — M54.12 CERVICAL RADICULOPATHY: Primary | ICD-10-CM

## 2023-05-16 NOTE — THERAPY DISCHARGE NOTE
Outpatient Physical Therapy Discharge Summary         Patient Name: Vania Garcia  : 1980  MRN: [id number]    Today's Date: 2023    Visit Dx:    ICD-10-CM ICD-9-CM   1. Cervical radiculopathy  M54.12 723.4           OP PT Discharge Summary  Date of Discharge: 23  Discharge Instructions/Additional Comments: Patient has not been seen within the last 30 days and no showed her last scheduled appointment will discharge at this time.      Time Calculation:                    Per Vera, PT  2023

## 2023-06-15 ENCOUNTER — OFFICE VISIT (OUTPATIENT)
Dept: FAMILY MEDICINE CLINIC | Facility: CLINIC | Age: 43
End: 2023-06-15
Payer: MEDICAID

## 2023-06-15 VITALS
OXYGEN SATURATION: 97 % | BODY MASS INDEX: 36.88 KG/M2 | HEIGHT: 67 IN | DIASTOLIC BLOOD PRESSURE: 82 MMHG | SYSTOLIC BLOOD PRESSURE: 130 MMHG | WEIGHT: 235 LBS | HEART RATE: 98 BPM

## 2023-06-15 DIAGNOSIS — Z11.3 SCREENING EXAMINATION FOR STD (SEXUALLY TRANSMITTED DISEASE): ICD-10-CM

## 2023-06-15 DIAGNOSIS — R06.2 WHEEZING: ICD-10-CM

## 2023-06-15 DIAGNOSIS — Z00.00 ANNUAL PHYSICAL EXAM: Primary | ICD-10-CM

## 2023-06-15 DIAGNOSIS — K21.9 GASTROESOPHAGEAL REFLUX DISEASE, UNSPECIFIED WHETHER ESOPHAGITIS PRESENT: ICD-10-CM

## 2023-06-15 DIAGNOSIS — E78.2 MIXED HYPERLIPIDEMIA: ICD-10-CM

## 2023-06-15 DIAGNOSIS — M54.2 CERVICALGIA: ICD-10-CM

## 2023-06-15 DIAGNOSIS — Z13.1 SCREENING FOR DIABETES MELLITUS: ICD-10-CM

## 2023-06-15 DIAGNOSIS — E66.9 OBESITY (BMI 35.0-39.9 WITHOUT COMORBIDITY): ICD-10-CM

## 2023-06-15 DIAGNOSIS — Z72.0 TOBACCO USE: ICD-10-CM

## 2023-06-15 PROCEDURE — 87661 TRICHOMONAS VAGINALIS AMPLIF: CPT | Performed by: STUDENT IN AN ORGANIZED HEALTH CARE EDUCATION/TRAINING PROGRAM

## 2023-06-15 PROCEDURE — 99396 PREV VISIT EST AGE 40-64: CPT | Performed by: STUDENT IN AN ORGANIZED HEALTH CARE EDUCATION/TRAINING PROGRAM

## 2023-06-15 PROCEDURE — 87491 CHLMYD TRACH DNA AMP PROBE: CPT | Performed by: STUDENT IN AN ORGANIZED HEALTH CARE EDUCATION/TRAINING PROGRAM

## 2023-06-15 PROCEDURE — 87591 N.GONORRHOEAE DNA AMP PROB: CPT | Performed by: STUDENT IN AN ORGANIZED HEALTH CARE EDUCATION/TRAINING PROGRAM

## 2023-06-15 PROCEDURE — 87798 DETECT AGENT NOS DNA AMP: CPT | Performed by: STUDENT IN AN ORGANIZED HEALTH CARE EDUCATION/TRAINING PROGRAM

## 2023-06-15 PROCEDURE — 87801 DETECT AGNT MULT DNA AMPLI: CPT | Performed by: STUDENT IN AN ORGANIZED HEALTH CARE EDUCATION/TRAINING PROGRAM

## 2023-06-15 RX ORDER — OMEPRAZOLE 40 MG/1
40 CAPSULE, DELAYED RELEASE ORAL DAILY
Qty: 90 CAPSULE | Refills: 3 | Status: SHIPPED | OUTPATIENT
Start: 2023-06-15

## 2023-06-15 RX ORDER — METHOCARBAMOL 500 MG/1
500 TABLET, FILM COATED ORAL 3 TIMES DAILY PRN
Qty: 30 TABLET | Refills: 0 | Status: SHIPPED | OUTPATIENT
Start: 2023-06-15

## 2023-06-15 RX ORDER — ALBUTEROL SULFATE 90 UG/1
2 AEROSOL, METERED RESPIRATORY (INHALATION) EVERY 4 HOURS PRN
Qty: 8 G | Refills: 11 | Status: SHIPPED | OUTPATIENT
Start: 2023-06-15

## 2023-06-15 NOTE — PROGRESS NOTES
Physical Exam     Patient Name: Vania Garcia  : 1980   MRN: [id number]   Care Team: Patient Care Team:  Unique Aguilar DO as PCP - General (Internal Medicine)  Igor Colón MD as Obstetrician (Obstetrics and Gynecology)    Chief Complaint:    Chief Complaint   Patient presents with   • Annual Exam       History of Present Illness: Vania Garcia is a 43 y.o. female with allergies, obesity, GERD, tobacco use who is presents today for annual healthcare maintenance. Still working at [employer]     She has previously followed with Ortho, Dr. Li at [facility]. Has received injections for plantar fascitis.     Reports some improvement in cervical radiculopathy with PT. Continues to feel some muscular tension coming and going which she relieves with stretching, PT exercises. Sometimes with full improvement but sometimes lingering.     She has cut back to drinking 1-2 days per week.     She continues to smoke cigarettes - 1ppd. She has NRT patches at home which help with cravings but continues to fall into the habit in the car, at work, etc.     Having a little vaginal discharge/odor. Concerned for BV infection as it feels similar. No new sexual partners but interested in STD screening.     PHQ-2 Depression Screening  Little interest or pleasure in doing things? 0-->not at all   Feeling down, depressed, or hopeless? 0-->not at all   PHQ-2 Total Score 0         Health Maintenance   Topic Date Due   • PAP SMEAR  Never done   • LIPID PANEL  06/15/2023   • COVID-19 Vaccine (1) 2023 (Originally 1980)   • Pneumococcal Vaccine 0-64 (1 - PCV) 06/15/2024 (Originally 3/18/1986)   • INFLUENZA VACCINE  10/01/2023   • ANNUAL PHYSICAL  06/15/2024   • TDAP/TD VACCINES (2 - Td or Tdap) 2029   • HEPATITIS C SCREENING  Completed       Subjective      Review of Systems:   Review of Systems - See HPI    Past Medical History: History reviewed. No pertinent past medical  history.    Past Surgical History:   Past Surgical History:   Procedure Laterality Date   • ANKLE SURGERY Right    • KNEE ACL RECONSTRUCTION Right 10/31/2019    Procedure: KNEE ANTERIOR CRUCIATE LIGAMENT RECONSTRUCTION WITH AUTOGRAFT HAMSTRINGS RIGHT, ALLOGRAFT;  Surgeon: Quang Alex MD;  Location: [facility];  Service: Orthopedics   • WISDOM TOOTH EXTRACTION         Family History:   Family History   Problem Relation Age of Onset   • Breast cancer Neg Hx    • Ovarian cancer Neg Hx    • Colon cancer Neg Hx    • Diabetes Neg Hx        Social History:   Social History     Socioeconomic History   • Marital status: Single   • Number of children: 2   Tobacco Use   • Smoking status: Every Day     Packs/day: 1.00     Years: 26.00     Pack years: 26.00     Types: Cigarettes   • Smokeless tobacco: Never   Vaping Use   • Vaping Use: Never used   Substance and Sexual Activity   • Alcohol use: Yes     Alcohol/week: 3.0 standard drinks     Types: 3 Shots of liquor per week   • Drug use: Yes     Types: Marijuana   • Sexual activity: Yes     Partners: Male     Birth control/protection: Depo-provera       Tobacco History:   Social History     Tobacco Use   Smoking Status Every Day   • Packs/day: 1.00   • Years: 26.00   • Pack years: 26.00   • Types: Cigarettes   Smokeless Tobacco Never       Medications:     Current Outpatient Medications:   •  albuterol sulfate  (90 Base) MCG/ACT inhaler, Inhale 2 puffs Every 4 (Four) Hours As Needed for Wheezing., Disp: 8 g, Rfl: 11  •  benzoyl peroxide 5 % external wash, APPLY TO AFFECTED AREA(S) TWO TIMES A DAY AS DIRECTED, Disp: 148 g, Rfl: 1  •  betamethasone dipropionate 0.05 % cream, , Disp: , Rfl:   •  fluticasone (Flonase) 50 MCG/ACT nasal spray, 1 spray into the nostril(s) as directed by provider Daily., Disp: 9.9 mL, Rfl: 11  •  ketoconazole (NIZORAL) 2 % shampoo, , Disp: , Rfl:   •  loratadine (Claritin) 10 MG tablet, Take 1 tablet by mouth Daily., Disp: 30 tablet,  "Rfl: 2  •  omeprazole (priLOSEC) 40 MG capsule, Take 1 capsule by mouth Daily., Disp: 90 capsule, Rfl: 3  •  Retin-A 0.05 % cream, , Disp: , Rfl:   •  valACYclovir (VALTREX) 1000 MG tablet, TAKE ONE TABLET BY MOUTH DAILY FOR 5 DAYS CONTINUE USE AS NEEDED FOR FLARES, Disp: 30 tablet, Rfl: 0  •  methocarbamol (ROBAXIN) 500 MG tablet, Take 1 tablet by mouth 3 (Three) Times a Day As Needed for Muscle Spasms., Disp: 30 tablet, Rfl: 0    Allergies:   No Known Allergies    Past Medical History, Social History, Family History and Care Team were all reviewed with patient and updated as appropriate.       Objective     Physical Exam:  Vital Signs:   Vitals:    06/15/23 1405   BP: 130/82   Pulse: 98   SpO2: 97%   Weight: 107 kg (235 lb)   Height: 170.2 cm (67\")     Body mass index is 36.81 kg/m².     Physical Exam  Vitals reviewed.   Constitutional:       Appearance: Normal appearance. She is not ill-appearing.   Cardiovascular:      Rate and Rhythm: Normal rate and regular rhythm.      Pulses: Normal pulses.      Heart sounds: Normal heart sounds.   Pulmonary:      Effort: Pulmonary effort is normal. No respiratory distress.      Breath sounds: Normal breath sounds. No wheezing.   Skin:     General: Skin is warm and dry.   Neurological:      Mental Status: She is alert.   Psychiatric:         Mood and Affect: Mood normal.         Behavior: Behavior normal.         Judgment: Judgment normal.         Assessment / Plan      Assessment/Plan:   Problems Addressed This Visit  Diagnoses and all orders for this visit:    1. Annual physical exam (Primary)  -     CBC (No Diff); Future  -     Lipid Panel; Future  -     Hemoglobin A1c; Future  -     Comprehensive Metabolic Panel; Future  -     TSH; Future    Healthcare Maintenance   Vaccines:  -COVID19 and PCV20 due - she politely declines both today  -Influenza due in the fall  -Tdap 2019  -Shingrix due at 50     Cancer Screening  -Mammogram - BIRADs 1, 09/2023   -Colonoscopy discuss at " age 45  -Pap smear due - has appointment with GYN in 08/2023 and plans to have this then  -Lung cancer screening discuss at 50     Other  -PHQ score 0  -Counseled on importance of maintaining healthy diet and routine exercise. Encouraged 150 min exercise per week.  -Tobacco cessation: as detailed below  -Sexual Health: STD screening today. Discussed safe sexual practice.   -Continues to have regular monthly menstrual cycles. No contraception and she declines contraception discussion today.   -Blood pressure is at goal   -Metabolic screening due today     Encouraged routine eye and dental exams.    2. Screening for diabetes mellitus  -     Hemoglobin A1c; Future    3. Mixed hyperlipidemia  -     Lipid Panel; Future    4. Tobacco use  Encouraged smoking cessation and counseled on adverse health risks associated with continued smoking. Discussed options for assistance including NRT, medications, and cessation therapy. She has NRT patches at home and will work towards breaking habits.     5. Obesity (BMI 35.0-39.9 without comorbidity)    6. Cervicalgia  -     methocarbamol (ROBAXIN) 500 MG tablet; Take 1 tablet by mouth 3 (Three) Times a Day As Needed for Muscle Spasms.  Dispense: 30 tablet; Refill: 0    7. Screening examination for STD (sexually transmitted disease)  -     NuSwab VG+ - Swab, Vagina; Future    8. Gastroesophageal reflux disease, unspecified whether esophagitis present  -     omeprazole (priLOSEC) 40 MG capsule; Take 1 capsule by mouth Daily.  Dispense: 90 capsule; Refill: 3    9. Wheezing  -     albuterol sulfate  (90 Base) MCG/ACT inhaler; Inhale 2 puffs Every 4 (Four) Hours As Needed for Wheezing.  Dispense: 8 g; Refill: 11      Plan of care reviewed with patient at the conclusion of today's visit. Education was provided regarding diagnosis and management.  Patient verbalizes understanding of and agreement with management plan.    Follow Up:   Return in about 1 year (around 6/15/2024) for  Annual.        DO AIRAM Guerrero RD  [facility]  [address]  [facility] [phone]  Fax [phone]  Phone [phone]

## 2023-06-19 DIAGNOSIS — N76.0 BACTERIAL VAGINOSIS: Primary | ICD-10-CM

## 2023-06-19 DIAGNOSIS — B96.89 BACTERIAL VAGINOSIS: Primary | ICD-10-CM

## 2023-06-19 RX ORDER — METRONIDAZOLE 500 MG/1
500 TABLET ORAL 2 TIMES DAILY
Qty: 14 TABLET | Refills: 0 | Status: SHIPPED | OUTPATIENT
Start: 2023-06-19

## 2023-11-14 DIAGNOSIS — B00.9 HSV (HERPES SIMPLEX VIRUS) INFECTION: ICD-10-CM

## 2023-11-14 RX ORDER — VALACYCLOVIR HYDROCHLORIDE 1 G/1
1000 TABLET, FILM COATED ORAL DAILY
Qty: 30 TABLET | Refills: 0 | Status: SHIPPED | OUTPATIENT
Start: 2023-11-14

## 2023-11-14 NOTE — TELEPHONE ENCOUNTER
Rx Refill Note  Requested Prescriptions     Pending Prescriptions Disp Refills    valACYclovir (VALTREX) 1000 MG tablet 30 tablet 0      Last office visit with prescribing clinician: 6/15/2023     Next office visit with prescribing clinician: 6/15/24  Letitia Robert MA  11/14/23, 14:06 EST

## 2024-01-17 ENCOUNTER — TELEMEDICINE (OUTPATIENT)
Dept: FAMILY MEDICINE CLINIC | Facility: TELEHEALTH | Age: 44
End: 2024-01-17
Payer: COMMERCIAL

## 2024-01-17 DIAGNOSIS — U07.1 LOWER RESPIRATORY TRACT INFECTION DUE TO COVID-19 VIRUS: Primary | ICD-10-CM

## 2024-01-17 DIAGNOSIS — J01.90 ACUTE SINUSITIS, RECURRENCE NOT SPECIFIED, UNSPECIFIED LOCATION: ICD-10-CM

## 2024-01-17 DIAGNOSIS — J22 LOWER RESPIRATORY TRACT INFECTION DUE TO COVID-19 VIRUS: Primary | ICD-10-CM

## 2024-01-17 RX ORDER — PREDNISONE 20 MG/1
20 TABLET ORAL 2 TIMES DAILY
Qty: 10 TABLET | Refills: 0 | Status: SHIPPED | OUTPATIENT
Start: 2024-01-17 | End: 2024-01-22

## 2024-01-17 RX ORDER — AMOXICILLIN AND CLAVULANATE POTASSIUM 875; 125 MG/1; MG/1
1 TABLET, FILM COATED ORAL 2 TIMES DAILY
Qty: 20 TABLET | Refills: 0 | Status: SHIPPED | OUTPATIENT
Start: 2024-01-17 | End: 2024-01-27

## 2024-01-17 NOTE — PROGRESS NOTES
Subjective   Chief Complaint   Patient presents with    Nasal Congestion       Vania Garcia is a 43 y.o. female.     History of Present Illness  Patient is a 43-year-old female who presents to the [facility] urgent care with complaint of nasal congestion for the past month.  Associated with facial pressure, bilateral ear congestion, yellow nasal drainage that is blood-tinged that began yesterday, and productive cough with yellow sputum that began yesterday.  Patient denies fever, sore throat, chest pain, pleuritic chest pain, current shortness of breath, and headache.  Patient states today she feels like the infection is now moving into her chest with associated intermittent wheezing and shortness of breath that is relieved by using her albuterol inhaler.  Patient states she is a tobacco smoker and was given an albuterol inhaler to use for episodes of wheezing.  Patient states she has used her albuterol inhaler 2 times in the past 2 days.       No Known Allergies    History reviewed. No pertinent past medical history.    Past Surgical History:   Procedure Laterality Date    ANKLE SURGERY Right     KNEE ACL RECONSTRUCTION Right 10/31/2019    Procedure: KNEE ANTERIOR CRUCIATE LIGAMENT RECONSTRUCTION WITH AUTOGRAFT HAMSTRINGS RIGHT, ALLOGRAFT;  Surgeon: Quang Alex MD;  Location: [facility];  Service: Orthopedics    WISDOM TOOTH EXTRACTION         Social History     Socioeconomic History    Marital status: Single    Number of children: 2   Tobacco Use    Smoking status: Every Day     Packs/day: 1.00     Years: 26.00     Additional pack years: 0.00     Total pack years: 26.00     Types: Cigarettes    Smokeless tobacco: Never   Vaping Use    Vaping Use: Never used   Substance and Sexual Activity    Alcohol use: Yes     Alcohol/week: 3.0 standard drinks of alcohol     Types: 3 Shots of liquor per week    Drug use: Yes     Types: Marijuana    Sexual activity: Yes     Partners: Male     Birth  control/protection: Depo-provera       Family History   Problem Relation Age of Onset    Breast cancer Neg Hx     Ovarian cancer Neg Hx     Colon cancer Neg Hx     Diabetes Neg Hx          Current Outpatient Medications:     albuterol sulfate  (90 Base) MCG/ACT inhaler, Inhale 2 puffs Every 4 (Four) Hours As Needed for Wheezing., Disp: 8 g, Rfl: 11    amoxicillin-clavulanate (AUGMENTIN) 875-125 MG per tablet, Take 1 tablet by mouth 2 (Two) Times a Day for 10 days., Disp: 20 tablet, Rfl: 0    benzoyl peroxide 5 % external wash, APPLY TO AFFECTED AREA(S) TWO TIMES A DAY AS DIRECTED, Disp: 148 g, Rfl: 1    betamethasone dipropionate 0.05 % cream, , Disp: , Rfl:     fluticasone (Flonase) 50 MCG/ACT nasal spray, 1 spray into the nostril(s) as directed by provider Daily., Disp: 9.9 mL, Rfl: 11    ketoconazole (NIZORAL) 2 % shampoo, , Disp: , Rfl:     loratadine (Claritin) 10 MG tablet, Take 1 tablet by mouth Daily., Disp: 30 tablet, Rfl: 2    methocarbamol (ROBAXIN) 500 MG tablet, Take 1 tablet by mouth 3 (Three) Times a Day As Needed for Muscle Spasms., Disp: 30 tablet, Rfl: 0    omeprazole (priLOSEC) 40 MG capsule, Take 1 capsule by mouth Daily., Disp: 90 capsule, Rfl: 3    predniSONE (DELTASONE) 20 MG tablet, Take 1 tablet by mouth 2 (Two) Times a Day for 5 days., Disp: 10 tablet, Rfl: 0    Retin-A 0.05 % cream, , Disp: , Rfl:     valACYclovir (VALTREX) 1000 MG tablet, Take 1 tablet by mouth Daily., Disp: 30 tablet, Rfl: 0      Review of Systems   Constitutional:  Negative for chills and fever.   HENT:  Positive for congestion, ear pain and rhinorrhea. Negative for sore throat, trouble swallowing and voice change.    Eyes:  Negative for redness.   Respiratory:  Positive for cough, shortness of breath (intermittent) and wheezing (intermittent). Negative for chest tightness.    Neurological:  Negative for headache.        There were no vitals filed for this visit.    Objective   Physical Exam  Constitutional:        General: She is not in acute distress.     Appearance: Normal appearance. She is not ill-appearing or toxic-appearing.   HENT:      Head: Normocephalic and atraumatic.      Right Ear: External ear normal.      Left Ear: External ear normal.      Nose: Congestion present.      Mouth/Throat:      Mouth: Mucous membranes are moist.   Eyes:      Conjunctiva/sclera: Conjunctivae normal.   Pulmonary:      Effort: Pulmonary effort is normal. No respiratory distress.      Comments: Speaking normally without signs of respiratory distress.  Musculoskeletal:      Cervical back: Normal range of motion and neck supple.   Skin:     General: Skin is dry.   Neurological:      General: No focal deficit present.      Mental Status: She is alert and oriented to person, place, and time.   Psychiatric:         Mood and Affect: Mood normal.         Behavior: Behavior normal.         Thought Content: Thought content normal.         Judgment: Judgment normal.          Procedures     Assessment & Plan   Diagnoses and all orders for this visit:    1. Lower respiratory tract infection due to COVID-19 virus (Primary)    2. Acute sinusitis, recurrence not specified, unspecified location    Other orders  -     amoxicillin-clavulanate (AUGMENTIN) 875-125 MG per tablet; Take 1 tablet by mouth 2 (Two) Times a Day for 10 days.  Dispense: 20 tablet; Refill: 0  -     predniSONE (DELTASONE) 20 MG tablet; Take 1 tablet by mouth 2 (Two) Times a Day for 5 days.  Dispense: 10 tablet; Refill: 0            MDM:     Advised the following: Encourage fluids.  Take medications as directed.  Advise OTC cough and cold medications.  Follow up with PCP if symptoms do not improve or worsen.  Advise motrin and tylenol for body aches and fever.  Go to the Emergency Department if shortness of air or chest pain develop.  Use albuterol inhaler 2 puffs every 4-6 hours as needed for cough and wheezing.    Discussed dosing, side effects, recommended other symptomatic care.   Patient should follow up with primary care provider, Urgent Care or ER if symptoms worsen, fail to resolve or other symptoms need attention. Patient/family agree to the above.         Amira Chiang PA-C     The use of a video visit has been reviewed with the patient and verbal informed consent has been obtained. Myself and Vania Garcia participated in this visit. The patient is located at [address] [place] [address]. I am located in [place], [place]. [employer] and [employer] were utilized.        This visit was performed via Telehealth.  This patient has been instructed to follow-up with their primary care provider if their symptoms worsen or the treatment provided does not resolve their illness.

## 2024-01-17 NOTE — PATIENT INSTRUCTIONS
Encourage fluids.  Take medications as directed.  Advise OTC cough and cold medications.  Follow up with PCP if symptoms do not improve or worsen.  Advise motrin and tylenol for body aches and fever.  Go to the Emergency Department if shortness of air or chest pain develop.  Use albuterol inhaler 2 puffs every 4-6 hours as needed for cough and wheezing.

## 2024-05-24 ENCOUNTER — TELEMEDICINE (OUTPATIENT)
Dept: FAMILY MEDICINE CLINIC | Facility: TELEHEALTH | Age: 44
End: 2024-05-24
Payer: COMMERCIAL

## 2024-05-24 DIAGNOSIS — R39.89 SUSPECTED UTI: Primary | ICD-10-CM

## 2024-05-24 RX ORDER — PHENAZOPYRIDINE HYDROCHLORIDE 200 MG/1
200 TABLET, FILM COATED ORAL 3 TIMES DAILY PRN
Qty: 6 TABLET | Refills: 0 | Status: SHIPPED | OUTPATIENT
Start: 2024-05-24 | End: 2024-05-26

## 2024-05-24 RX ORDER — NITROFURANTOIN 25; 75 MG/1; MG/1
100 CAPSULE ORAL 2 TIMES DAILY
Qty: 10 CAPSULE | Refills: 0 | Status: SHIPPED | OUTPATIENT
Start: 2024-05-24 | End: 2024-05-29

## 2024-05-24 NOTE — PROGRESS NOTES
Subjective   Vania Garcia is a 44 y.o. female.     History of Present Illness  She thinks she has a UTI. Her symptoms started today with urinary urgency, dysuria, blood when wiping. She has never had a UTI before. She is not concerned about STDs, does not think she is pregnant. She has taken ibuprofen and cranberry juice.       The following portions of the patient's history were reviewed and updated as appropriate: allergies, current medications, past family history, past medical history, past social history, past surgical history, and problem list.    Review of Systems   Constitutional:  Negative for fever.   Gastrointestinal:  Negative for abdominal pain, nausea and vomiting.   Genitourinary:  Positive for dysuria, frequency, pelvic pain and urgency. Negative for flank pain, vaginal bleeding, vaginal discharge and vaginal pain.   Musculoskeletal:  Negative for back pain.       Objective   Physical Exam  Constitutional:       General: She is not in acute distress.     Appearance: She is well-developed. She is not diaphoretic.   Pulmonary:      Effort: Pulmonary effort is normal.   Neurological:      Mental Status: She is alert and oriented to person, place, and time.   Psychiatric:         Behavior: Behavior normal.           Assessment & Plan   Diagnoses and all orders for this visit:    1. Suspected UTI (Primary)  -     nitrofurantoin, macrocrystal-monohydrate, (Macrobid) 100 MG capsule; Take 1 capsule by mouth 2 (Two) Times a Day for 5 days.  Dispense: 10 capsule; Refill: 0  -     phenazopyridine (Pyridium) 200 MG tablet; Take 1 tablet by mouth 3 (Three) Times a Day As Needed for Bladder Spasms or Dysuria for up to 2 days.  Dispense: 6 tablet; Refill: 0                 The use of a video visit has been reviewed with the patient and verbal informed consent has been obtained. Myself and Vania Garcia participated in this visit. The patient is located in [place], [place] at home. I am located in  [place]. [employer] and [employer] were utilized. I spent 10 minutes in the patient's chart for this visit.

## 2024-07-14 DIAGNOSIS — K21.9 GASTROESOPHAGEAL REFLUX DISEASE, UNSPECIFIED WHETHER ESOPHAGITIS PRESENT: ICD-10-CM

## 2024-07-15 RX ORDER — OMEPRAZOLE 40 MG/1
40 CAPSULE, DELAYED RELEASE ORAL DAILY
Qty: 30 CAPSULE | Refills: 0 | Status: SHIPPED | OUTPATIENT
Start: 2024-07-15

## 2024-07-15 NOTE — TELEPHONE ENCOUNTER
Rx Refill Note  Requested Prescriptions     Pending Prescriptions Disp Refills    omeprazole (priLOSEC) 40 MG capsule [Pharmacy Med Name: OMEPRAZOLE DR 40 MG CAPSULE] 90 capsule 3     Sig: TAKE 1 CAPSULE BY MOUTH DAILY      Last office visit with prescribing clinician: 6/15/2023   Last telemedicine visit with prescribing clinician: Visit date not found   Next office visit with prescribing clinician: Visit date not found                         Would you like a call back once the refill request has been completed: [] Yes [] No    If the office needs to give you a call back, can they leave a voicemail: [] Yes [] No    Maria Carpenter MA  07/15/24, 13:19 EDT

## 2024-08-18 DIAGNOSIS — K21.9 GASTROESOPHAGEAL REFLUX DISEASE, UNSPECIFIED WHETHER ESOPHAGITIS PRESENT: ICD-10-CM

## 2024-08-19 RX ORDER — OMEPRAZOLE 40 MG/1
40 CAPSULE, DELAYED RELEASE ORAL DAILY
Qty: 30 CAPSULE | Refills: 0 | Status: SHIPPED | OUTPATIENT
Start: 2024-08-19

## 2024-08-19 NOTE — TELEPHONE ENCOUNTER
Rx Refill Note  Requested Prescriptions     Pending Prescriptions Disp Refills    omeprazole (priLOSEC) 40 MG capsule [Pharmacy Med Name: OMEPRAZOLE DR 40 MG CAPSULE] 30 capsule 0     Sig: TAKE 1 CAPSULE BY MOUTH DAILY      Last office visit with prescribing clinician: 6/15/2023   Last telemedicine visit with prescribing clinician: Visit date not found   Next office visit with prescribing clinician: Visit date not found                         Would you like a call back once the refill request has been completed: [] Yes [] No    If the office needs to give you a call back, can they leave a voicemail: [] Yes [] No    Maria Carpenter MA  08/19/24, 08:58 EDT

## 2024-08-20 ENCOUNTER — TELEMEDICINE (OUTPATIENT)
Dept: FAMILY MEDICINE CLINIC | Facility: TELEHEALTH | Age: 44
End: 2024-08-20
Payer: COMMERCIAL

## 2024-08-20 DIAGNOSIS — N39.0 URINARY TRACT INFECTION WITHOUT HEMATURIA, SITE UNSPECIFIED: Primary | ICD-10-CM

## 2024-08-20 RX ORDER — NITROFURANTOIN 25; 75 MG/1; MG/1
100 CAPSULE ORAL 2 TIMES DAILY
Qty: 14 CAPSULE | Refills: 0 | Status: SHIPPED | OUTPATIENT
Start: 2024-08-20 | End: 2024-08-27

## 2024-08-20 RX ORDER — PHENAZOPYRIDINE HYDROCHLORIDE 200 MG/1
200 TABLET, FILM COATED ORAL 3 TIMES DAILY PRN
Qty: 6 TABLET | Refills: 0 | Status: SHIPPED | OUTPATIENT
Start: 2024-08-20 | End: 2024-08-22

## 2024-08-20 NOTE — PROGRESS NOTES
Chief Complaint   Patient presents with    Urinary Tract Infection       Video Visit Reason:   Free Text Description: Pretty sure it is a bladder infection. Only have pain at the end of my pee. No burning or discharge.  Subjective   Vania Garcia is a 44 y.o. female.     Urinary Tract Infection   This is a new problem. The current episode started in the past 7 days. The problem occurs constantly. The quality of the pain is described as burning. The pain is mild. There has been no fever. Associated symptoms include frequency, hesitancy and urgency. Pertinent negatives include no chills, discharge, flank pain, hematuria or nausea. She has tried increased fluids for the symptoms. The treatment provided no relief.       The following portions of the patient's history were reviewed and updated as appropriate: allergies, current medications, past medical history, and problem list.      Past Medical History:   Diagnosis Date    Urinary tract infection      Social History     Socioeconomic History    Marital status: Single    Number of children: 2   Tobacco Use    Smoking status: Every Day     Current packs/day: 1.00     Average packs/day: 1 pack/day for 26.0 years (26.0 ttl pk-yrs)     Types: Cigarettes    Smokeless tobacco: Never   Vaping Use    Vaping status: Never Used   Substance and Sexual Activity    Alcohol use: Yes     Alcohol/week: 3.0 standard drinks of alcohol     Types: 3 Shots of liquor per week    Drug use: Yes     Types: Marijuana    Sexual activity: Yes     Partners: Male     Birth control/protection: Depo-provera     medication documentation: reviewed and updated with patient and   Current Outpatient Medications:     albuterol sulfate  (90 Base) MCG/ACT inhaler, Inhale 2 puffs Every 4 (Four) Hours As Needed for Wheezing., Disp: 8 g, Rfl: 11    benzoyl peroxide 5 % external wash, APPLY TO AFFECTED AREA(S) TWO TIMES A DAY AS DIRECTED, Disp: 148 g, Rfl: 1    betamethasone dipropionate 0.05 %  cream, , Disp: , Rfl:     fluticasone (Flonase) 50 MCG/ACT nasal spray, 1 spray into the nostril(s) as directed by provider Daily., Disp: 9.9 mL, Rfl: 11    ketoconazole (NIZORAL) 2 % shampoo, , Disp: , Rfl:     loratadine (Claritin) 10 MG tablet, Take 1 tablet by mouth Daily., Disp: 30 tablet, Rfl: 2    methocarbamol (ROBAXIN) 500 MG tablet, Take 1 tablet by mouth 3 (Three) Times a Day As Needed for Muscle Spasms., Disp: 30 tablet, Rfl: 0    nitrofurantoin, macrocrystal-monohydrate, (MACROBID) 100 MG capsule, Take 1 capsule by mouth 2 (Two) Times a Day for 7 days., Disp: 14 capsule, Rfl: 0    omeprazole (priLOSEC) 40 MG capsule, TAKE 1 CAPSULE BY MOUTH DAILY, Disp: 30 capsule, Rfl: 0    phenazopyridine (Pyridium) 200 MG tablet, Take 1 tablet by mouth 3 (Three) Times a Day As Needed for Bladder Spasms for up to 2 days., Disp: 6 tablet, Rfl: 0    Retin-A 0.05 % cream, , Disp: , Rfl:     valACYclovir (VALTREX) 1000 MG tablet, Take 1 tablet by mouth Daily., Disp: 30 tablet, Rfl: 0  Review of Systems   Constitutional:  Negative for chills.   Gastrointestinal:  Negative for abdominal pain and nausea.   Genitourinary:  Positive for dysuria, frequency, hesitancy and urgency. Negative for decreased urine volume, difficulty urinating, enuresis, flank pain, hematuria, pelvic pain and vaginal discharge.   Musculoskeletal:  Negative for back pain.       Objective   Physical Exam  Constitutional:       Appearance: She is well-developed.   HENT:      Head: Normocephalic and atraumatic.   Pulmonary:      Effort: Pulmonary effort is normal.   Neurological:      Mental Status: She is alert.   Psychiatric:         Speech: Speech normal.         Assessment & Plan   Diagnoses and all orders for this visit:    1. Urinary tract infection without hematuria, site unspecified (Primary)  -     nitrofurantoin, macrocrystal-monohydrate, (MACROBID) 100 MG capsule; Take 1 capsule by mouth 2 (Two) Times a Day for 7 days.  Dispense: 14 capsule;  Refill: 0  -     phenazopyridine (Pyridium) 200 MG tablet; Take 1 tablet by mouth 3 (Three) Times a Day As Needed for Bladder Spasms for up to 2 days.  Dispense: 6 tablet; Refill: 0                    Follow Up:  If your symptoms are not resolving by the completion of your treatment or are worsening, see your primary care provider for follow up. If you don't have a primary care provider, you may go to any Urgent Care for re-evaluation. If you develop any life threatening symptoms, go to the nearest Emergency Department immediately or call EMS.               The use of  Video Visit was utilized during this visit, using both [employer] and [employer]. The use of a video visit has been reviewed with the patient and verbal informed consent has been obtained. No technical difficulties occurred during the visit.    is located at [address] [place] [id number]  Provider is located at [place], [place]

## 2024-09-13 NOTE — PROGRESS NOTES
DORA Lou    Nerve Cath Post Op Call    Patient Name: Vania Garcia  :  1980  MRN:  [id number]  Date of Discharge: 10/31/2019    Nerve Cath Post Op Call:    Catheter Plan:Patient/Family member report nerve catheter previously discontinued, tip intact                               warm

## 2024-09-15 DIAGNOSIS — K21.9 GASTROESOPHAGEAL REFLUX DISEASE, UNSPECIFIED WHETHER ESOPHAGITIS PRESENT: ICD-10-CM

## 2024-09-16 RX ORDER — OMEPRAZOLE 40 MG/1
40 CAPSULE, DELAYED RELEASE ORAL DAILY
Qty: 30 CAPSULE | Refills: 0 | OUTPATIENT
Start: 2024-09-16

## 2024-10-09 ENCOUNTER — LAB (OUTPATIENT)
Dept: LAB | Facility: HOSPITAL | Age: 44
End: 2024-10-09
Payer: MEDICAID

## 2024-10-09 ENCOUNTER — OFFICE VISIT (OUTPATIENT)
Dept: FAMILY MEDICINE CLINIC | Facility: CLINIC | Age: 44
End: 2024-10-09
Payer: MEDICAID

## 2024-10-09 VITALS
WEIGHT: 246.2 LBS | BODY MASS INDEX: 38.64 KG/M2 | HEART RATE: 89 BPM | OXYGEN SATURATION: 99 % | SYSTOLIC BLOOD PRESSURE: 124 MMHG | HEIGHT: 67 IN | DIASTOLIC BLOOD PRESSURE: 92 MMHG

## 2024-10-09 DIAGNOSIS — B35.1 ONYCHOMYCOSIS: ICD-10-CM

## 2024-10-09 DIAGNOSIS — R30.0 DYSURIA: ICD-10-CM

## 2024-10-09 DIAGNOSIS — Z12.31 ENCOUNTER FOR SCREENING MAMMOGRAM FOR MALIGNANT NEOPLASM OF BREAST: ICD-10-CM

## 2024-10-09 DIAGNOSIS — Z13.1 SCREENING FOR DIABETES MELLITUS: ICD-10-CM

## 2024-10-09 DIAGNOSIS — Z13.29 SCREENING FOR THYROID DISORDER: ICD-10-CM

## 2024-10-09 DIAGNOSIS — R06.2 WHEEZING: ICD-10-CM

## 2024-10-09 DIAGNOSIS — J30.2 SEASONAL ALLERGIC RHINITIS, UNSPECIFIED TRIGGER: ICD-10-CM

## 2024-10-09 DIAGNOSIS — Z13.220 SCREENING FOR CHOLESTEROL LEVEL: ICD-10-CM

## 2024-10-09 DIAGNOSIS — L60.0 INGROWN NAIL: ICD-10-CM

## 2024-10-09 DIAGNOSIS — K21.9 GASTROESOPHAGEAL REFLUX DISEASE, UNSPECIFIED WHETHER ESOPHAGITIS PRESENT: Primary | ICD-10-CM

## 2024-10-09 DIAGNOSIS — B00.9 HSV (HERPES SIMPLEX VIRUS) INFECTION: ICD-10-CM

## 2024-10-09 DIAGNOSIS — Z71.6 ENCOUNTER FOR SMOKING CESSATION COUNSELING: ICD-10-CM

## 2024-10-09 DIAGNOSIS — Z00.00 ANNUAL PHYSICAL EXAM: ICD-10-CM

## 2024-10-09 DIAGNOSIS — E66.812 CLASS 2 OBESITY WITHOUT SERIOUS COMORBIDITY WITH BODY MASS INDEX (BMI) OF 35.0 TO 35.9 IN ADULT, UNSPECIFIED OBESITY TYPE: ICD-10-CM

## 2024-10-09 LAB
ALBUMIN SERPL-MCNC: 4.3 G/DL (ref 3.5–5.2)
ALBUMIN/GLOB SERPL: 1.4 G/DL
ALP SERPL-CCNC: 72 U/L (ref 39–117)
ALT SERPL W P-5'-P-CCNC: 23 U/L (ref 1–33)
ANION GAP SERPL CALCULATED.3IONS-SCNC: 8 MMOL/L (ref 5–15)
AST SERPL-CCNC: 16 U/L (ref 1–32)
BACTERIA UR QL AUTO: ABNORMAL /HPF
BILIRUB SERPL-MCNC: 0.5 MG/DL (ref 0–1.2)
BILIRUB UR QL STRIP: NEGATIVE
BUN SERPL-MCNC: 6 MG/DL (ref 6–20)
BUN/CREAT SERPL: 7.7 (ref 7–25)
CALCIUM SPEC-SCNC: 9.4 MG/DL (ref 8.6–10.5)
CHLORIDE SERPL-SCNC: 102 MMOL/L (ref 98–107)
CHOLEST SERPL-MCNC: 266 MG/DL (ref 0–200)
CLARITY UR: CLEAR
CO2 SERPL-SCNC: 26 MMOL/L (ref 22–29)
COLOR UR: YELLOW
CREAT SERPL-MCNC: 0.78 MG/DL (ref 0.57–1)
DEPRECATED RDW RBC AUTO: 46.4 FL (ref 37–54)
EGFRCR SERPLBLD CKD-EPI 2021: 96.2 ML/MIN/1.73
ERYTHROCYTE [DISTWIDTH] IN BLOOD BY AUTOMATED COUNT: 12.2 % (ref 12.3–15.4)
GLOBULIN UR ELPH-MCNC: 3 GM/DL
GLUCOSE SERPL-MCNC: 93 MG/DL (ref 65–99)
GLUCOSE UR STRIP-MCNC: NEGATIVE MG/DL
HBA1C MFR BLD: 5.1 % (ref 4.8–5.6)
HCT VFR BLD AUTO: 44.6 % (ref 34–46.6)
HDLC SERPL-MCNC: 41 MG/DL (ref 40–60)
HGB BLD-MCNC: 14.8 G/DL (ref 12–15.9)
HGB UR QL STRIP.AUTO: ABNORMAL
HYALINE CASTS UR QL AUTO: ABNORMAL /LPF
KETONES UR QL STRIP: NEGATIVE
LDLC SERPL CALC-MCNC: 177 MG/DL (ref 0–100)
LDLC/HDLC SERPL: 4.25 {RATIO}
LEUKOCYTE ESTERASE UR QL STRIP.AUTO: NEGATIVE
MCH RBC QN AUTO: 33.6 PG (ref 26.6–33)
MCHC RBC AUTO-ENTMCNC: 33.2 G/DL (ref 31.5–35.7)
MCV RBC AUTO: 101.4 FL (ref 79–97)
NITRITE UR QL STRIP: NEGATIVE
PH UR STRIP.AUTO: 6 [PH] (ref 5–8)
PLATELET # BLD AUTO: 344 10*3/MM3 (ref 140–450)
PMV BLD AUTO: 12.5 FL (ref 6–12)
POTASSIUM SERPL-SCNC: 4.4 MMOL/L (ref 3.5–5.2)
PROT SERPL-MCNC: 7.3 G/DL (ref 6–8.5)
PROT UR QL STRIP: NEGATIVE
RBC # BLD AUTO: 4.4 10*6/MM3 (ref 3.77–5.28)
RBC # UR STRIP: ABNORMAL /HPF
REF LAB TEST METHOD: ABNORMAL
SODIUM SERPL-SCNC: 136 MMOL/L (ref 136–145)
SP GR UR STRIP: 1.02 (ref 1–1.03)
SQUAMOUS #/AREA URNS HPF: ABNORMAL /HPF
TRIGL SERPL-MCNC: 254 MG/DL (ref 0–150)
TSH SERPL DL<=0.05 MIU/L-ACNC: 2.76 UIU/ML (ref 0.27–4.2)
UROBILINOGEN UR QL STRIP: ABNORMAL
VLDLC SERPL-MCNC: 48 MG/DL (ref 5–40)
WBC # UR STRIP: ABNORMAL /HPF
WBC NRBC COR # BLD AUTO: 7.09 10*3/MM3 (ref 3.4–10.8)

## 2024-10-09 PROCEDURE — 83036 HEMOGLOBIN GLYCOSYLATED A1C: CPT | Performed by: STUDENT IN AN ORGANIZED HEALTH CARE EDUCATION/TRAINING PROGRAM

## 2024-10-09 PROCEDURE — 1159F MED LIST DOCD IN RCRD: CPT | Performed by: STUDENT IN AN ORGANIZED HEALTH CARE EDUCATION/TRAINING PROGRAM

## 2024-10-09 PROCEDURE — 1160F RVW MEDS BY RX/DR IN RCRD: CPT | Performed by: STUDENT IN AN ORGANIZED HEALTH CARE EDUCATION/TRAINING PROGRAM

## 2024-10-09 PROCEDURE — 80061 LIPID PANEL: CPT | Performed by: STUDENT IN AN ORGANIZED HEALTH CARE EDUCATION/TRAINING PROGRAM

## 2024-10-09 PROCEDURE — 81001 URINALYSIS AUTO W/SCOPE: CPT | Performed by: STUDENT IN AN ORGANIZED HEALTH CARE EDUCATION/TRAINING PROGRAM

## 2024-10-09 PROCEDURE — 99396 PREV VISIT EST AGE 40-64: CPT | Performed by: STUDENT IN AN ORGANIZED HEALTH CARE EDUCATION/TRAINING PROGRAM

## 2024-10-09 PROCEDURE — 80050 GENERAL HEALTH PANEL: CPT | Performed by: STUDENT IN AN ORGANIZED HEALTH CARE EDUCATION/TRAINING PROGRAM

## 2024-10-09 PROCEDURE — 99214 OFFICE O/P EST MOD 30 MIN: CPT | Performed by: STUDENT IN AN ORGANIZED HEALTH CARE EDUCATION/TRAINING PROGRAM

## 2024-10-09 PROCEDURE — 1125F AMNT PAIN NOTED PAIN PRSNT: CPT | Performed by: STUDENT IN AN ORGANIZED HEALTH CARE EDUCATION/TRAINING PROGRAM

## 2024-10-09 RX ORDER — LORATADINE 10 MG/1
10 TABLET ORAL DAILY
Qty: 30 TABLET | Refills: 11 | Status: SHIPPED | OUTPATIENT
Start: 2024-10-09

## 2024-10-09 RX ORDER — VALACYCLOVIR HYDROCHLORIDE 1 G/1
1000 TABLET, FILM COATED ORAL DAILY
Qty: 30 TABLET | Refills: 0 | Status: SHIPPED | OUTPATIENT
Start: 2024-10-09

## 2024-10-09 RX ORDER — OMEPRAZOLE 40 MG/1
40 CAPSULE, DELAYED RELEASE ORAL DAILY
Qty: 30 CAPSULE | Refills: 11 | Status: SHIPPED | OUTPATIENT
Start: 2024-10-09

## 2024-10-09 RX ORDER — NICOTINE 21 MG/24HR
1 PATCH, TRANSDERMAL 24 HOURS TRANSDERMAL EVERY 24 HOURS
Qty: 14 EACH | Refills: 0 | Status: SHIPPED | OUTPATIENT
Start: 2024-10-09

## 2024-10-09 RX ORDER — ALBUTEROL SULFATE 90 UG/1
2 INHALANT RESPIRATORY (INHALATION) EVERY 4 HOURS PRN
Qty: 8 G | Refills: 11 | Status: SHIPPED | OUTPATIENT
Start: 2024-10-09

## 2024-10-09 RX ORDER — TERBINAFINE HYDROCHLORIDE 250 MG/1
250 TABLET ORAL DAILY
Qty: 84 TABLET | Refills: 0 | Status: SHIPPED | OUTPATIENT
Start: 2024-10-09 | End: 2025-01-01

## 2024-10-09 NOTE — PROGRESS NOTES
Established Office Visit      Patient Name: Vania Garcia  : 1980   MRN: [id number]   Care Team: Patient Care Team:  Unique Beth DO as PCP - General (Internal Medicine)  Igor Colón MD as Obstetrician (Obstetrics and Gynecology)    Chief Complaint:    Chief Complaint   Patient presents with   • Hyperlipidemia   • Obesity   • Nail Problem     Toe nails        History of Present Illness: Vania Garcia is a 44 y.o. female with allergies, obesity, GERD, tobacco use who is here today to follow up with several concerns. She is not currently working. She got  this summer and is very happy about this.      She has experiences some intermittent bladder pressure with urinating. She last had UTI about 6 weeks ago and completed treatment with macrobid. Symptoms resolved with treatment. Not having symptoms today but coming and going nature of symptoms has her concerned.    Tobacco use - 1ppd x 30 years, interested in quitting and would like to try patches    Needs refill of PPI. GERD well controlled with this.     Due for routine labs    Overdue for mammogram and pap    She has toe nail fungus or which she wants treatment. Reports some nails are tender as they are curling inwards.     Subjective      Review of Systems:   Review of Systems - See HPI    I have reviewed and the following portions of the patient's history were updated as appropriate: past family history, past medical history, past social history, past surgical history and problem list.    Medications:     Current Outpatient Medications:   •  albuterol sulfate  (90 Base) MCG/ACT inhaler, Inhale 2 puffs Every 4 (Four) Hours As Needed for Wheezing., Disp: 8 g, Rfl: 11  •  benzoyl peroxide 5 % external wash, APPLY TO AFFECTED AREA(S) TWO TIMES A DAY AS DIRECTED, Disp: 148 g, Rfl: 1  •  betamethasone dipropionate 0.05 % cream, , Disp: , Rfl:   •  fluticasone (Flonase) 50 MCG/ACT nasal spray, 1 spray into the  "nostril(s) as directed by provider Daily., Disp: 9.9 mL, Rfl: 11  •  ketoconazole (NIZORAL) 2 % shampoo, , Disp: , Rfl:   •  loratadine (Claritin) 10 MG tablet, Take 1 tablet by mouth Daily., Disp: 30 tablet, Rfl: 11  •  methocarbamol (ROBAXIN) 500 MG tablet, Take 1 tablet by mouth 3 (Three) Times a Day As Needed for Muscle Spasms., Disp: 30 tablet, Rfl: 0  •  omeprazole (priLOSEC) 40 MG capsule, Take 1 capsule by mouth Daily., Disp: 30 capsule, Rfl: 11  •  Retin-A 0.05 % cream, , Disp: , Rfl:   •  valACYclovir (VALTREX) 1000 MG tablet, Take 1 tablet by mouth Daily., Disp: 30 tablet, Rfl: 0  •  nicotine (Nicoderm CQ) 21 MG/24HR patch, Place 1 patch on the skin as directed by provider Daily., Disp: 14 each, Rfl: 0  •  terbinafine (lamiSIL) 250 MG tablet, Take 1 tablet by mouth Daily for 84 days., Disp: 84 tablet, Rfl: 0    Allergies:   No Known Allergies    Objective     Physical Exam:  Vital Signs:   Vitals:    10/09/24 0818   BP: 124/92   Pulse: 89   SpO2: 99%   Weight: 112 kg (246 lb 3.2 oz)   Height: 170.2 cm (67\")     Body mass index is 38.56 kg/m².     Physical Exam  Vitals reviewed.   Constitutional:       Appearance: Normal appearance.   Cardiovascular:      Rate and Rhythm: Normal rate and regular rhythm.      Heart sounds: Normal heart sounds.   Pulmonary:      Effort: Pulmonary effort is normal. No respiratory distress.      Breath sounds: Normal breath sounds. No wheezing.   Skin:     General: Skin is warm and dry.      Comments: Toe nails are thickened, ingrown and tender   Neurological:      Mental Status: She is alert.   Psychiatric:         Mood and Affect: Mood normal.         Behavior: Behavior normal.         Judgment: Judgment normal.         Assessment / Plan      Assessment/Plan:   Problems Addressed This Visit  Diagnoses and all orders for this visit:    1. Gastroesophageal reflux disease, unspecified whether esophagitis present (Primary)  -     CBC (No Diff); Future  -     Comprehensive " Metabolic Panel; Future  -     TSH; Future  -     omeprazole (priLOSEC) 40 MG capsule; Take 1 capsule by mouth Daily.  Dispense: 30 capsule; Refill: 11  -     CBC (No Diff)  -     Comprehensive Metabolic Panel  -     TSH    2. Class 2 obesity without serious comorbidity with body mass index (BMI) of 35.0 to 35.9 in adult, unspecified obesity type  -     CBC (No Diff); Future  -     Lipid Panel; Future  -     Hemoglobin A1c; Future  -     Comprehensive Metabolic Panel; Future  -     TSH; Future  -     CBC (No Diff)  -     Lipid Panel  -     Hemoglobin A1c  -     Comprehensive Metabolic Panel  -     TSH    3. Screening for cholesterol level  -     Lipid Panel; Future  -     Lipid Panel    4. Screening for diabetes mellitus  -     Hemoglobin A1c; Future  -     Hemoglobin A1c    5. Screening for thyroid disorder  -     TSH; Future  -     TSH    6. Encounter for screening mammogram for malignant neoplasm of breast  -     Mammo Screening Digital Tomosynthesis Bilateral With CAD; Future    7. HSV (herpes simplex virus) infection  -     valACYclovir (VALTREX) 1000 MG tablet; Take 1 tablet by mouth Daily.  Dispense: 30 tablet; Refill: 0    8. Seasonal allergic rhinitis, unspecified trigger  -     loratadine (Claritin) 10 MG tablet; Take 1 tablet by mouth Daily.  Dispense: 30 tablet; Refill: 11    9. Wheezing  -     albuterol sulfate  (90 Base) MCG/ACT inhaler; Inhale 2 puffs Every 4 (Four) Hours As Needed for Wheezing.  Dispense: 8 g; Refill: 11    10. Encounter for smoking cessation counseling  -     nicotine (Nicoderm CQ) 21 MG/24HR patch; Place 1 patch on the skin as directed by provider Daily.  Dispense: 14 each; Refill: 0    11. Onychomycosis  -     Ambulatory Referral to Podiatry  -     terbinafine (lamiSIL) 250 MG tablet; Take 1 tablet by mouth Daily for 84 days.  Dispense: 84 tablet; Refill: 0  -     Comprehensive metabolic panel; Future    12. Ingrown nail  -     Ambulatory Referral to Podiatry  -      terbinafine (lamiSIL) 250 MG tablet; Take 1 tablet by mouth Daily for 84 days.  Dispense: 84 tablet; Refill: 0    13. Dysuria  -     Urinalysis With Microscopic - Urine, Clean Catch; Future    14. Annual physical exam      Healthcare Maintenance   Vaccines:  -COVID19, influenza, and PCV20 declined     Cancer Screening  -Mammogram due - ordered   -Colonoscopy due at age 45  -Pap smear due - she plans to follow up with GYN  -Lung cancer screening - discuss at age 50    Other  -Counseled on importance of maintaining healthy diet and routine exercise. Encouraged 150 min exercise per week.  -Sexual Health: does not wish for any contraception.  -Blood pressure is above goal <130/80.   -Metabolic screening due today     Onychomycosis - CMP today, start terbinafine x 12 weeks and referred to podiatry for ingrown nails    GERD - continue PPI    Encouraged smoking cessation and counseled on adverse health risks associated with continued smoking. Discussed options for assistance including NRT, medications, and cessation therapy. Patient is agreeable to quitting smoking and will try patches.         Plan of care reviewed with patient at the conclusion of today's visit. Education was provided regarding diagnosis and management.  Patient verbalizes understanding of and agreement with management plan.    Follow Up:   Return in about 3 months (around 1/9/2025) for Recheck smoking cessation, toe fungus, blood pressure.        DO AIRAM Brown RD  [facility]  [id number]  [facility] [phone]  Fax [phone]  Phone [phone]

## 2024-10-16 DIAGNOSIS — J30.2 SEASONAL ALLERGIC RHINITIS, UNSPECIFIED TRIGGER: ICD-10-CM

## 2024-10-16 DIAGNOSIS — L70.0 ACNE VULGARIS: ICD-10-CM

## 2024-10-16 DIAGNOSIS — M54.2 CERVICALGIA: ICD-10-CM

## 2024-10-16 RX ORDER — FLUTICASONE PROPIONATE 50 UG/1
1 SPRAY, METERED NASAL DAILY
Qty: 9.9 ML | Refills: 11 | Status: SHIPPED | OUTPATIENT
Start: 2024-10-16

## 2024-10-16 RX ORDER — METHOCARBAMOL 500 MG/1
500 TABLET, FILM COATED ORAL 3 TIMES DAILY PRN
Qty: 90 TABLET | Refills: 0 | Status: SHIPPED | OUTPATIENT
Start: 2024-10-16

## 2024-10-16 RX ORDER — BENZOYL PEROXIDE 50 MG/ML
LIQUID TOPICAL SEE ADMIN INSTRUCTIONS
Qty: 148 G | Refills: 1 | Status: SHIPPED | OUTPATIENT
Start: 2024-10-16

## 2024-10-21 ENCOUNTER — HOSPITAL ENCOUNTER (OUTPATIENT)
Dept: MAMMOGRAPHY | Facility: HOSPITAL | Age: 44
Discharge: HOME | End: 2024-10-21
Admitting: STUDENT IN AN ORGANIZED HEALTH CARE EDUCATION/TRAINING PROGRAM
Payer: MEDICAID

## 2024-10-21 DIAGNOSIS — Z12.31 ENCOUNTER FOR SCREENING MAMMOGRAM FOR MALIGNANT NEOPLASM OF BREAST: ICD-10-CM

## 2024-10-21 PROCEDURE — 77063 BREAST TOMOSYNTHESIS BI: CPT

## 2024-10-21 PROCEDURE — 77067 SCR MAMMO BI INCL CAD: CPT

## 2024-10-22 DIAGNOSIS — D75.89 MACROCYTOSIS WITHOUT ANEMIA: Primary | ICD-10-CM

## 2024-11-11 DIAGNOSIS — B00.9 HSV (HERPES SIMPLEX VIRUS) INFECTION: ICD-10-CM

## 2024-11-11 RX ORDER — VALACYCLOVIR HYDROCHLORIDE 1 G/1
1000 TABLET, FILM COATED ORAL DAILY
Qty: 30 TABLET | Refills: 0 | Status: SHIPPED | OUTPATIENT
Start: 2024-11-11

## 2024-12-15 DIAGNOSIS — B00.9 HSV (HERPES SIMPLEX VIRUS) INFECTION: ICD-10-CM

## 2024-12-16 RX ORDER — VALACYCLOVIR HYDROCHLORIDE 1 G/1
1000 TABLET, FILM COATED ORAL DAILY
Qty: 90 TABLET | Refills: 0 | Status: SHIPPED | OUTPATIENT
Start: 2024-12-16
